# Patient Record
Sex: MALE | Race: WHITE | NOT HISPANIC OR LATINO | Employment: OTHER | URBAN - METROPOLITAN AREA
[De-identification: names, ages, dates, MRNs, and addresses within clinical notes are randomized per-mention and may not be internally consistent; named-entity substitution may affect disease eponyms.]

---

## 2017-06-12 ENCOUNTER — RECORDS - HEALTHEAST (OUTPATIENT)
Dept: LAB | Facility: CLINIC | Age: 57
End: 2017-06-12

## 2017-06-12 LAB
CHOLEST SERPL-MCNC: 197 MG/DL
FASTING STATUS PATIENT QL REPORTED: NORMAL
HDLC SERPL-MCNC: 65 MG/DL
LDLC SERPL CALC-MCNC: 115 MG/DL
TRIGL SERPL-MCNC: 84 MG/DL

## 2017-07-26 ENCOUNTER — THERAPY VISIT (OUTPATIENT)
Dept: PHYSICAL THERAPY | Facility: CLINIC | Age: 57
End: 2017-07-26
Payer: COMMERCIAL

## 2017-07-26 DIAGNOSIS — M54.50 LUMBAGO: ICD-10-CM

## 2017-07-26 DIAGNOSIS — M25.511 SHOULDER PAIN, RIGHT: ICD-10-CM

## 2017-07-26 PROCEDURE — 97161 PT EVAL LOW COMPLEX 20 MIN: CPT | Mod: GP | Performed by: PHYSICAL THERAPIST

## 2017-07-26 PROCEDURE — 97110 THERAPEUTIC EXERCISES: CPT | Mod: GP | Performed by: PHYSICAL THERAPIST

## 2017-07-26 NOTE — LETTER
[place] [facility]  [address]  [address]  [facility] [address]  [phone]    2017    Re: Nato Garcia   :   1960  MRN:  [id number]   REFERRING PHYSICIAN:   Félix Huitron    [facility]  Date of Initial Evaluation:  2017  Visits:  Rxs Used: 1  Reason for Referral:     Shoulder pain, right  Lumbago    [facility] Initial Evaluation  Subjective:  Patient is a 57-year-old male presenting with rehab right shoulder hpi.  The history is provided by the patient. No  was used.   Nato Garcia is a 57-year-old male with a right shoulder condition.  Condition occurred with: Unknown cause.  Condition occurred: for unknown reasons.  This is a new condition.  The patient is reporting insidious onset R shoulder pain that began in 2017.  Feels the pain in the joint, but has difficulty describing location. Reports it increases while reaching behind his back, golfing, weight lifting and while performing stretches from his referring provider.   The patient is right handed.  At the end of the session the eval, the patient also requested a back stretch.  Feels R sided low back pain w/o radiation following an event yesterday in which he was lifting.    Patient reports pain: In the joint.  Radiates to: none.  Pain is described as aching and is intermittent and reported as 2/10.  Associated symptoms: Loss of motion/stiffness. Pain is worse during the night.  Symptoms are exacerbated by using arm behind back, other, lifting, using arm overhead and lying on extremity (golfing) and relieved by rest.  Since onset symptoms are gradually improving.  Special tests: X-ray (normal).  Previous treatment includes other (deep tissue massage).  General health as reported by patient is good.  Pertinent medical history includes: High blood pressure.  Medical allergies: no.  Other surgeries include: None reported.  Current  medications: Anti-inflammatory and high blood pressure medication.  Current occupation is retired.        Objective:  Lumbar/SI Evaluation  ROM:  Arom wnl lumbar: REIL: decreases pain.  AROM Lumbar:   Flexion:        Midshins  Ext:                    50%   Side Bend:        Left:     Right:   Rotation:           Left:     Right:   Side Glide:        Left:     Right:         Shoulder Evaluation:  AROM:    Flexion:  Left:  180    Right:  155  Extension: Left: 73Right: 55  Abduction:  Left: 180   Right:  162  Flexion/External Rotation:  Left:  T5    Right:  T3  Extension/Internal Rotation:  Left:  T10    Right:  L2    PROM:  : R shldr PROM wfl all directions except IR.  Internal Rotation:  Left:  55    Right:  51  Strength:  : Low Trap R: +3/5++, L; 4/5.  Flexion: Left:5/5       Right: 5/5       Extension:  Left: 5/5        Right: 5/5          Re: Nato Garcia   :   1960      Abduction:  Left: 5/5     Right: 4+/5      Pain:++  Internal Rotation:  Left:5/5         Right: 5-/5      Pain:+  External Rotation:   Left:5/5       Right:5-/5      Pain:+    Horizontal Adduction:  Left:4+/5         Right:4-/5       Special Tests:    Right shoulder positive for the following special tests:Impingement  Right shoulder negative for the following special tests:Rotator cuff tear and Acrimioclavicular  Palpation:  normal    Assessment/Plan:    Patient is a 57 year old male with lumbar and right side shoulder complaints.    Patient has the following significant findings with corresponding treatment plan.                Diagnosis 1:  R shoulder pain  Pain -  hot/cold therapy, manual therapy, education and home program  Decreased ROM/flexibility - manual therapy and therapeutic exercise  Decreased strength - therapeutic exercise and therapeutic activities  Impaired muscle performance - neuro re-education  Decreased function - therapeutic activities  Diagnosis 2:  R sided low back pain   Pain -  hot/cold therapy, manual  therapy, education, directional preference exercise and home program  Decreased ROM/flexibility - manual therapy and therapeutic exercise  Decreased function - therapeutic activities    Therapy Evaluation Codes:   1) History comprised of:   Personal factors that impact the plan of care:  Time since onset of symptoms.    Comorbidity factors that impact the plan of care are:  High blood pressure.     Medications impacting care: Anti-inflammatory and High blood pressure.  2) Examination of Body Systems comprised of:   Body structures and functions that impact the plan of care:     Lumbar spine and Shoulder.   Activity limitations that impact the plan of care are:     Dressing, Lifting, Sports, Laying down and reaching.  3) Clinical presentation characteristics are:  Stable/Uncomplicated.  4) Decision-Making:  Low complexity using standardized patient assessment instrument and/or measureable assessment of functional outcome.  Cumulative Therapy Evaluation is: Low complexity.    Previous and current functional limitations: (See Goal Flow Sheet for this information)    Short term and Long term goals: (See Goal Flow Sheet for this information)   Communication ability: Patient appears to be able to clearly communicate and understand verbal and written communication and follow directions correctly.  Treatment Explanation - The following has been discussed with the patient: RX ordered/plan of care, Anticipated outcomes, Possible risks and side effects  This patient would benefit from PT intervention to resume normal activities.   Rehab potential is good.  Frequency:  1 X week, once daily  Duration:  for 8 weeks  Discharge Plan:  Achieve all LTG.  Independent in home treatment program.  Reach maximal therapeutic benefit.      Thank you for your referral.    INQUIRIES  Therapist: Muriel Boyle, PT  [facility]  [address] [place] [address]  [address]  [facility] [address]  Phone: [phone]/Fax: [phone]

## 2017-07-26 NOTE — PROGRESS NOTES
[place] [facility] Initial Evaluation    Subjective:    Patient is a 57 year old male presenting with rehab right shoulder hpi. The history is provided by the patient. No  was used.   Nato Garcia is a 57 year old male with a right shoulder condition.  Condition occurred with:  Unknown cause.  Condition occurred: for unknown reasons.  This is a new condition  The patient is reporting insidious onset R shoulder pain that began in March 2017. Feels the pain in the joint, but has difficulty describing location. Reports it increases while reaching behind his back, golfing, weight lifting and while performing stretches from his referring provider.     The patient is right handed.    At the end of the session the eval, the patient also requested a back stretch. Feels R sided low back pain w/o radiation following an event yesterday in which he was lifting..    Patient reports pain:  In the joint.  Radiates to: none.  Pain is described as aching and is intermittent and reported as 2/10.  Associated symptoms:  Loss of motion/stiffness. Pain is worse during the night.  Symptoms are exacerbated by using arm behind back, other, lifting, using arm overhead and lying on extremity (golfing) and relieved by rest.  Since onset symptoms are gradually improving.  Special tests:  X-ray (normal).  Previous treatment includes other (deep tissue massage).    General health as reported by patient is good.  Pertinent medical history includes:  High blood pressure.  Medical allergies: no.  Other surgeries include:  None reported.  Current medications:  Anti-inflammatory and high blood pressure medication.  Current occupation is retired.                                    Objective:    System         Lumbar/SI Evaluation  ROM:  Arom wnl lumbar: REIL: decreases pain.  AROM Lumbar:   Flexion:        Midshins  Ext:                    50%   Side Bend:        Left:     Right:   Rotation:           Left:      Right:   Side Glide:        Left:     Right:                                       Shoulder Evaluation:  ROM:  AROM:    Flexion:  Left:  180    Right:  155  Extension: Left: 73Right: 55  Abduction:  Left: 180   Right:  162                Flexion/External Rotation:  Left:  T5    Right:  T3  Extension/Internal Rotation:  Left:  T10    Right:  L2    PROM:  : R shldr PROM wfl all directions except IR.          Internal Rotation:  Left:  55    Right:  51                      Strength:  : Low Trap R: +3/5++, L; 4/5.  Flexion: Left:5/5   Pain:    Right: 5/5     Pain:   Extension:  Left: 5/5    Pain:    Right: 5/5    Pain:  Abduction:  Left: 5/5  Pain:    Right: 4+/5      Pain:++    Internal Rotation:  Left:5/5     Pain:    Right: 5-/5      Pain:+  External Rotation:   Left:5/5     Pain:   Right:5-/5      Pain:+      Horizontal Adduction:  Left:4+/5     Pain:    Right:4-/5     Pain:        Special Tests:      Right shoulder positive for the following special tests:Impingement  Right shoulder negative for the following special tests:Rotator cuff tear and Acrimioclavicular  Palpation:  normal                                         General     ROS    Assessment/Plan:      Patient is a 57 year old male with lumbar and right side shoulder complaints.    Patient has the following significant findings with corresponding treatment plan.                Diagnosis 1:  R shoulder pain  Pain -  hot/cold therapy, manual therapy, education and home program  Decreased ROM/flexibility - manual therapy and therapeutic exercise  Decreased strength - therapeutic exercise and therapeutic activities  Impaired muscle performance - neuro re-education  Decreased function - therapeutic activities  Diagnosis 2:  R sided low back pain   Pain -  hot/cold therapy, manual therapy, education, directional preference exercise and home program  Decreased ROM/flexibility - manual therapy and therapeutic exercise  Decreased function - therapeutic  activities    Therapy Evaluation Codes:   1) History comprised of:   Personal factors that impact the plan of care:      Time since onset of symptoms.    Comorbidity factors that impact the plan of care are:      High blood pressure.     Medications impacting care: Anti-inflammatory and High blood pressure.  2) Examination of Body Systems comprised of:   Body structures and functions that impact the plan of care:      Lumbar spine and Shoulder.   Activity limitations that impact the plan of care are:      Dressing, Lifting, Sports, Laying down and reaching.  3) Clinical presentation characteristics are:   Stable/Uncomplicated.  4) Decision-Making    Low complexity using standardized patient assessment instrument and/or measureable assessment of functional outcome.  Cumulative Therapy Evaluation is: Low complexity.    Previous and current functional limitations:  (See Goal Flow Sheet for this information)    Short term and Long term goals: (See Goal Flow Sheet for this information)     Communication ability:  Patient appears to be able to clearly communicate and understand verbal and written communication and follow directions correctly.  Treatment Explanation - The following has been discussed with the patient:   RX ordered/plan of care  Anticipated outcomes  Possible risks and side effects  This patient would benefit from PT intervention to resume normal activities.   Rehab potential is good.    Frequency:  1 X week, once daily  Duration:  for 8 weeks  Discharge Plan:  Achieve all LTG.  Independent in home treatment program.  Reach maximal therapeutic benefit.    Please refer to the daily flowsheet for treatment today, total treatment time and time spent performing 1:1 timed codes.

## 2017-08-07 ENCOUNTER — THERAPY VISIT (OUTPATIENT)
Dept: PHYSICAL THERAPY | Facility: CLINIC | Age: 57
End: 2017-08-07
Payer: COMMERCIAL

## 2017-08-07 DIAGNOSIS — M25.511 SHOULDER PAIN, RIGHT: ICD-10-CM

## 2017-08-07 DIAGNOSIS — M54.50 LUMBAGO: ICD-10-CM

## 2017-08-07 PROCEDURE — 97112 NEUROMUSCULAR REEDUCATION: CPT | Mod: GP | Performed by: PHYSICAL THERAPIST

## 2017-08-07 PROCEDURE — 97110 THERAPEUTIC EXERCISES: CPT | Mod: GP | Performed by: PHYSICAL THERAPIST

## 2017-08-14 ENCOUNTER — THERAPY VISIT (OUTPATIENT)
Dept: PHYSICAL THERAPY | Facility: CLINIC | Age: 57
End: 2017-08-14
Payer: COMMERCIAL

## 2017-08-14 DIAGNOSIS — M54.50 LUMBAGO: ICD-10-CM

## 2017-08-14 DIAGNOSIS — M25.511 SHOULDER PAIN, RIGHT: ICD-10-CM

## 2017-08-14 PROCEDURE — 97110 THERAPEUTIC EXERCISES: CPT | Mod: GP | Performed by: PHYSICAL THERAPIST

## 2017-08-14 PROCEDURE — 97112 NEUROMUSCULAR REEDUCATION: CPT | Mod: GP | Performed by: PHYSICAL THERAPIST

## 2017-08-28 ENCOUNTER — THERAPY VISIT (OUTPATIENT)
Dept: PHYSICAL THERAPY | Facility: CLINIC | Age: 57
End: 2017-08-28
Payer: COMMERCIAL

## 2017-08-28 DIAGNOSIS — M54.50 LUMBAGO: ICD-10-CM

## 2017-08-28 DIAGNOSIS — M25.511 SHOULDER PAIN, RIGHT: ICD-10-CM

## 2017-08-28 PROCEDURE — 97112 NEUROMUSCULAR REEDUCATION: CPT | Mod: GP | Performed by: PHYSICAL THERAPIST

## 2017-08-28 PROCEDURE — 97110 THERAPEUTIC EXERCISES: CPT | Mod: GP | Performed by: PHYSICAL THERAPIST

## 2017-09-11 ENCOUNTER — THERAPY VISIT (OUTPATIENT)
Dept: PHYSICAL THERAPY | Facility: CLINIC | Age: 57
End: 2017-09-11
Payer: COMMERCIAL

## 2017-09-11 DIAGNOSIS — M54.50 LUMBAGO: ICD-10-CM

## 2017-09-11 DIAGNOSIS — M25.511 SHOULDER PAIN, RIGHT: ICD-10-CM

## 2017-09-11 PROCEDURE — 97112 NEUROMUSCULAR REEDUCATION: CPT | Mod: GP | Performed by: PHYSICAL THERAPIST

## 2017-09-11 PROCEDURE — 97110 THERAPEUTIC EXERCISES: CPT | Mod: GP | Performed by: PHYSICAL THERAPIST

## 2017-09-11 NOTE — LETTER
[place] [facility]  [address]  [address]  [facility] [address]  [phone]    2017    Re: Nato Garcia   :   1960  MRN:  [id number]   REFERRING PHYSICIAN:   Félix Huitron    [facility]    Date of Initial Evaluation: 17  Visits:  Rxs Used: 5  Reason for Referral:     Shoulder pain, right  Lumbago    EVALUATION SUMMARY    Assessment/Plan:      DISCHARGE REPORT  Progress reporting period is from 2017 to 2017.       SUBJECTIVE  Subjective changes noted by patient:  States his R shoulder is feeling pretty good. Has been golfing and the R shoulder only bothers him a little. Does cont to have pain when reaching behind, but this is improving. Exercises are going well.    Current pain level is 0/10.     Previous pain level was  2/10.   Changes in function:  Yes (See Goal flowsheet attached for changes in current functional level)  Adverse reaction to treatment or activity: None    OBJECTIVE  Changes noted in objective findings:  Yes,   Objective:   AROM R Shldr Flx: 163, Abd: 170 painful, Ext/IR: T10 painful;   Strength R shldr horizontal abd: -5/5, Low Trap: -4/5, Flx: 5/5, Abd: 5/5; IR: 5/5, ER: 5/5     ASSESSMENT/PLAN  Updated problem list and treatment plan: Diagnosis 1:  R shoulder pain  Pain -  home program  Decreased ROM/flexibility - home program  Decreased strength - home program  Impaired muscle performance - home program  Decreased function - home program  STG/LTGs have been met or progress has been made towards goals:  Yes (See Goal flow sheet completed today.)        Re: Nato Garcia   :   1960    Assessment of Progress: The patient's condition is improving.  The patient's condition has potential to improve.  Patient is meeting short term goals and is progressing towards long term goals.  Self Management Plans:  Patient is independent in a home treatment program and feels he can continue working  towards the remaining minimal pain on his own.  I have re-evaluated this patient and find that the nature, scope, duration and intensity of the therapy is appropriate for the medical condition of the patient.  Nato continues to require the following intervention to meet STG and LTG's:  PT intervention is no longer required to meet STG/LTG.    Recommendations:  This patient is ready to be discharged from therapy and continue their home treatment program.    Continue to work on HEP for resolution of remaining pain; check back should pain fail to improve with continued strengthening.          Thank you for your referral.    INQUIRIES  Therapist: _____________________________________________Muriel Boyle PT, DPT  [facility] [address] [address]  Phone: [phone]  Fax: [phone]

## 2017-09-11 NOTE — PROGRESS NOTES
Subjective:    HPI                    Objective:    System    Physical Exam    General     ROS    Assessment/Plan:      DISCHARGE REPORT    Progress reporting period is from 7/26/2017 to 9/11/2017.       SUBJECTIVE  Subjective changes noted by patient:  States his R shoulder is feeling pretty good. Has been golfing and the R shoulder only bothers him a little. Does cont to have pain when reaching behind, but this is improving. Exercises are going well.    Current pain level is 0/10.     Previous pain level was  2/10.   Changes in function:  Yes (See Goal flowsheet attached for changes in current functional level)  Adverse reaction to treatment or activity: None    OBJECTIVE  Changes noted in objective findings:  Yes,   Objective:   AROM R Shldr Flx: 163, Abd: 170 painful, Ext/IR: T10 painful;   Strength R shldr horizontal abd: -5/5, Low Trap: -4/5, Flx: 5/5, Abd: 5/5; IR: 5/5, ER: 5/5     ASSESSMENT/PLAN  Updated problem list and treatment plan: Diagnosis 1:  R shoulder pain  Pain -  home program  Decreased ROM/flexibility - home program  Decreased strength - home program  Impaired muscle performance - home program  Decreased function - home program  STG/LTGs have been met or progress has been made towards goals:  Yes (See Goal flow sheet completed today.)  Assessment of Progress: The patient's condition is improving.  The patient's condition has potential to improve.  Patient is meeting short term goals and is progressing towards long term goals.  Self Management Plans:  Patient is independent in a home treatment program and feels he can continue working towards the remaining minimal pain on his own.  I have re-evaluated this patient and find that the nature, scope, duration and intensity of the therapy is appropriate for the medical condition of the patient.  Nato continues to require the following intervention to meet STG and LTG's:  PT intervention is no longer required to meet  STG/LTG.    Recommendations:  This patient is ready to be discharged from therapy and continue their home treatment program.    Continue to work on HEP for resolution of remaining pain; check back should pain fail to improve with continued strengthening.    Please refer to the daily flowsheet for treatment today, total treatment time and time spent performing 1:1 timed codes.

## 2017-11-18 ENCOUNTER — TRANSFERRED RECORDS (OUTPATIENT)
Dept: HEALTH INFORMATION MANAGEMENT | Facility: CLINIC | Age: 57
End: 2017-11-18

## 2017-11-18 LAB
CREATININE (EXTERNAL): 0.87 MG/DL (ref 0.73–1.18)
GFR ESTIMATED (EXTERNAL): >60 ML/MIN/1.73M2
GFR ESTIMATED (IF AFRICAN AMERICAN) (EXTERNAL): >60 ML/MIN/1.73M2
GLUCOSE (EXTERNAL): 100 MG/DL (ref 70–180)
POTASSIUM (EXTERNAL): 3.8 MMOL/L (ref 3.5–5.1)

## 2018-07-19 ENCOUNTER — RECORDS - HEALTHEAST (OUTPATIENT)
Dept: LAB | Facility: CLINIC | Age: 58
End: 2018-07-19

## 2018-07-19 LAB
ANION GAP SERPL CALCULATED.3IONS-SCNC: 10 MMOL/L (ref 5–18)
BUN SERPL-MCNC: 14 MG/DL (ref 8–22)
CALCIUM SERPL-MCNC: 10.4 MG/DL (ref 8.5–10.5)
CHLORIDE BLD-SCNC: 100 MMOL/L (ref 98–107)
CO2 SERPL-SCNC: 28 MMOL/L (ref 22–31)
CREAT SERPL-MCNC: 0.87 MG/DL (ref 0.7–1.3)
GFR SERPL CREATININE-BSD FRML MDRD: >60 ML/MIN/1.73M2
GLUCOSE BLD-MCNC: 84 MG/DL (ref 70–125)
POTASSIUM BLD-SCNC: 4.5 MMOL/L (ref 3.5–5)
SODIUM SERPL-SCNC: 138 MMOL/L (ref 136–145)

## 2018-07-24 LAB
SHBG SERPL-SCNC: 24 NMOL/L (ref 11–80)
TESTOST FREE SERPL-MCNC: 6.76 NG/DL (ref 4.7–24.4)
TESTOST SERPL-MCNC: 290 NG/DL (ref 240–950)

## 2018-12-17 ENCOUNTER — RECORDS - HEALTHEAST (OUTPATIENT)
Dept: LAB | Facility: CLINIC | Age: 58
End: 2018-12-17

## 2018-12-17 LAB
CHOLEST SERPL-MCNC: 210 MG/DL
FASTING STATUS PATIENT QL REPORTED: ABNORMAL
HDLC SERPL-MCNC: 63 MG/DL
LDLC SERPL CALC-MCNC: 128 MG/DL
POTASSIUM BLD-SCNC: 4 MMOL/L (ref 3.5–5)
TRIGL SERPL-MCNC: 93 MG/DL

## 2019-08-06 ENCOUNTER — RECORDS - HEALTHEAST (OUTPATIENT)
Dept: LAB | Facility: CLINIC | Age: 59
End: 2019-08-06

## 2019-08-06 LAB
ALBUMIN SERPL-MCNC: 4.2 G/DL (ref 3.5–5)
ALP SERPL-CCNC: 57 U/L (ref 45–120)
ALT SERPL W P-5'-P-CCNC: 20 U/L (ref 0–45)
ANION GAP SERPL CALCULATED.3IONS-SCNC: 7 MMOL/L (ref 5–18)
AST SERPL W P-5'-P-CCNC: 23 U/L (ref 0–40)
BILIRUB SERPL-MCNC: 0.3 MG/DL (ref 0–1)
BUN SERPL-MCNC: 12 MG/DL (ref 8–22)
CALCIUM SERPL-MCNC: 9.7 MG/DL (ref 8.5–10.5)
CHLORIDE BLD-SCNC: 94 MMOL/L (ref 98–107)
CO2 SERPL-SCNC: 29 MMOL/L (ref 22–31)
CREAT SERPL-MCNC: 0.81 MG/DL (ref 0.7–1.3)
GFR SERPL CREATININE-BSD FRML MDRD: >60 ML/MIN/1.73M2
GLUCOSE BLD-MCNC: 87 MG/DL (ref 70–125)
POTASSIUM BLD-SCNC: 4.1 MMOL/L (ref 3.5–5)
PROT SERPL-MCNC: 6.8 G/DL (ref 6–8)
SODIUM SERPL-SCNC: 130 MMOL/L (ref 136–145)

## 2020-08-25 ENCOUNTER — RECORDS - HEALTHEAST (OUTPATIENT)
Dept: LAB | Facility: CLINIC | Age: 60
End: 2020-08-25

## 2020-08-25 LAB
ANION GAP SERPL CALCULATED.3IONS-SCNC: 9 MMOL/L (ref 5–18)
BUN SERPL-MCNC: 16 MG/DL (ref 8–22)
CALCIUM SERPL-MCNC: 9.8 MG/DL (ref 8.5–10.5)
CHLORIDE BLD-SCNC: 98 MMOL/L (ref 98–107)
CHOLEST SERPL-MCNC: 196 MG/DL
CO2 SERPL-SCNC: 27 MMOL/L (ref 22–31)
CREAT SERPL-MCNC: 0.95 MG/DL (ref 0.7–1.3)
FASTING STATUS PATIENT QL REPORTED: NORMAL
GFR SERPL CREATININE-BSD FRML MDRD: >60 ML/MIN/1.73M2
GLUCOSE BLD-MCNC: 95 MG/DL (ref 70–125)
HDLC SERPL-MCNC: 61 MG/DL
LDLC SERPL CALC-MCNC: 113 MG/DL
POTASSIUM BLD-SCNC: 4.2 MMOL/L (ref 3.5–5)
SODIUM SERPL-SCNC: 134 MMOL/L (ref 136–145)
TRIGL SERPL-MCNC: 108 MG/DL

## 2020-08-27 LAB
SHBG SERPL-SCNC: 30 NMOL/L (ref 11–80)
TESTOST FREE SERPL-MCNC: 14.07 NG/DL (ref 4.7–24.4)
TESTOST SERPL-MCNC: 614 NG/DL (ref 240–950)

## 2020-12-29 ENCOUNTER — RECORDS - HEALTHEAST (OUTPATIENT)
Dept: LAB | Facility: CLINIC | Age: 60
End: 2020-12-29

## 2020-12-29 LAB — PSA SERPL-MCNC: 0.8 NG/ML (ref 0–4.5)

## 2021-04-06 ENCOUNTER — IMMUNIZATION (OUTPATIENT)
Dept: FAMILY MEDICINE | Facility: OTHER | Age: 61
End: 2021-04-06
Attending: FAMILY MEDICINE
Payer: COMMERCIAL

## 2021-04-06 PROCEDURE — 0031A PR COVID VAC JANSSEN AD26 0.5ML: CPT

## 2021-04-06 PROCEDURE — 91303 PR COVID VAC JANSSEN AD26 0.5ML: CPT

## 2021-05-16 ENCOUNTER — HEALTH MAINTENANCE LETTER (OUTPATIENT)
Age: 61
End: 2021-05-16

## 2021-09-05 ENCOUNTER — HEALTH MAINTENANCE LETTER (OUTPATIENT)
Age: 61
End: 2021-09-05

## 2021-11-15 ENCOUNTER — LAB REQUISITION (OUTPATIENT)
Dept: LAB | Facility: CLINIC | Age: 61
End: 2021-11-15
Payer: COMMERCIAL

## 2021-11-15 DIAGNOSIS — R00.0 TACHYCARDIA, UNSPECIFIED: ICD-10-CM

## 2021-11-15 DIAGNOSIS — Z13.220 ENCOUNTER FOR SCREENING FOR LIPOID DISORDERS: ICD-10-CM

## 2021-11-15 DIAGNOSIS — I10 ESSENTIAL (PRIMARY) HYPERTENSION: ICD-10-CM

## 2021-11-15 LAB
ANION GAP SERPL CALCULATED.3IONS-SCNC: 10 MMOL/L (ref 5–18)
BUN SERPL-MCNC: 15 MG/DL (ref 8–22)
CALCIUM SERPL-MCNC: 10.5 MG/DL (ref 8.5–10.5)
CHLORIDE BLD-SCNC: 97 MMOL/L (ref 98–107)
CHOLEST SERPL-MCNC: 194 MG/DL
CO2 SERPL-SCNC: 29 MMOL/L (ref 22–31)
CREAT SERPL-MCNC: 0.91 MG/DL (ref 0.7–1.3)
FASTING STATUS PATIENT QL REPORTED: NORMAL
GFR SERPL CREATININE-BSD FRML MDRD: 90 ML/MIN/1.73M2
GLUCOSE BLD-MCNC: 85 MG/DL (ref 70–125)
HDLC SERPL-MCNC: 61 MG/DL
LDLC SERPL CALC-MCNC: 107 MG/DL
POTASSIUM BLD-SCNC: 3.5 MMOL/L (ref 3.5–5)
SODIUM SERPL-SCNC: 136 MMOL/L (ref 136–145)
TRIGL SERPL-MCNC: 129 MG/DL
TSH SERPL DL<=0.005 MIU/L-ACNC: 4.24 UIU/ML (ref 0.3–5)

## 2021-11-15 PROCEDURE — 84443 ASSAY THYROID STIM HORMONE: CPT | Mod: ORL | Performed by: PHYSICIAN ASSISTANT

## 2021-11-15 PROCEDURE — 80048 BASIC METABOLIC PNL TOTAL CA: CPT | Mod: ORL | Performed by: PHYSICIAN ASSISTANT

## 2021-11-15 PROCEDURE — 80061 LIPID PANEL: CPT | Mod: ORL | Performed by: PHYSICIAN ASSISTANT

## 2021-11-18 ENCOUNTER — HOSPITAL ENCOUNTER (OUTPATIENT)
Dept: CARDIOLOGY | Facility: CLINIC | Age: 61
Discharge: HOME | End: 2021-11-18
Admitting: PHYSICIAN ASSISTANT
Payer: COMMERCIAL

## 2021-11-18 DIAGNOSIS — R00.0 TACHYCARDIA: ICD-10-CM

## 2021-11-18 PROCEDURE — 93270 REMOTE 30 DAY ECG REV/REPORT: CPT

## 2021-12-20 PROCEDURE — 93272 ECG/REVIEW INTERPRET ONLY: CPT | Performed by: INTERNAL MEDICINE

## 2022-05-11 ENCOUNTER — LAB REQUISITION (OUTPATIENT)
Dept: LAB | Facility: CLINIC | Age: 62
End: 2022-05-11
Payer: COMMERCIAL

## 2022-05-11 DIAGNOSIS — J45.20 MILD INTERMITTENT ASTHMA, UNCOMPLICATED: ICD-10-CM

## 2022-05-11 PROCEDURE — U0005 INFEC AGEN DETEC AMPLI PROBE: HCPCS | Mod: ORL | Performed by: FAMILY MEDICINE

## 2022-05-12 LAB — SARS-COV-2 RNA RESP QL NAA+PROBE: NEGATIVE

## 2022-06-12 ENCOUNTER — HEALTH MAINTENANCE LETTER (OUTPATIENT)
Age: 62
End: 2022-06-12

## 2022-08-22 ENCOUNTER — TRANSFERRED RECORDS (OUTPATIENT)
Dept: HEALTH INFORMATION MANAGEMENT | Facility: CLINIC | Age: 62
End: 2022-08-22

## 2022-10-05 ENCOUNTER — MEDICAL CORRESPONDENCE (OUTPATIENT)
Dept: HEALTH INFORMATION MANAGEMENT | Facility: CLINIC | Age: 62
End: 2022-10-05

## 2022-10-05 ENCOUNTER — TRANSFERRED RECORDS (OUTPATIENT)
Dept: HEALTH INFORMATION MANAGEMENT | Facility: CLINIC | Age: 62
End: 2022-10-05

## 2022-10-05 ENCOUNTER — LAB REQUISITION (OUTPATIENT)
Dept: LAB | Facility: CLINIC | Age: 62
End: 2022-10-05
Payer: COMMERCIAL

## 2022-10-05 DIAGNOSIS — I10 ESSENTIAL (PRIMARY) HYPERTENSION: ICD-10-CM

## 2022-10-05 DIAGNOSIS — I47.10 SUPRAVENTRICULAR TACHYCARDIA (H): ICD-10-CM

## 2022-10-05 LAB
ANION GAP SERPL CALCULATED.3IONS-SCNC: 12 MMOL/L (ref 7–15)
BUN SERPL-MCNC: 15.2 MG/DL (ref 8–23)
CALCIUM SERPL-MCNC: 10.2 MG/DL (ref 8.8–10.2)
CHLORIDE SERPL-SCNC: 96 MMOL/L (ref 98–107)
CHOLEST SERPL-MCNC: 224 MG/DL
CREAT SERPL-MCNC: 0.87 MG/DL (ref 0.67–1.17)
DEPRECATED HCO3 PLAS-SCNC: 27 MMOL/L (ref 22–29)
GFR SERPL CREATININE-BSD FRML MDRD: >90 ML/MIN/1.73M2
GLUCOSE SERPL-MCNC: 103 MG/DL (ref 70–99)
HDLC SERPL-MCNC: 72 MG/DL
LDLC SERPL CALC-MCNC: 137 MG/DL
NONHDLC SERPL-MCNC: 152 MG/DL
POTASSIUM SERPL-SCNC: 4 MMOL/L (ref 3.4–5.3)
SODIUM SERPL-SCNC: 135 MMOL/L (ref 136–145)
TRIGL SERPL-MCNC: 75 MG/DL
TSH SERPL DL<=0.005 MIU/L-ACNC: 3.48 UIU/ML (ref 0.3–4.2)

## 2022-10-05 PROCEDURE — 80048 BASIC METABOLIC PNL TOTAL CA: CPT | Mod: ORL | Performed by: FAMILY MEDICINE

## 2022-10-05 PROCEDURE — 80061 LIPID PANEL: CPT | Mod: ORL | Performed by: FAMILY MEDICINE

## 2022-10-05 PROCEDURE — 84443 ASSAY THYROID STIM HORMONE: CPT | Mod: ORL | Performed by: FAMILY MEDICINE

## 2022-10-06 ENCOUNTER — TRANSFERRED RECORDS (OUTPATIENT)
Dept: HEALTH INFORMATION MANAGEMENT | Facility: CLINIC | Age: 62
End: 2022-10-06

## 2022-10-06 LAB — EJECTION FRACTION: 66 %

## 2022-10-11 ENCOUNTER — TRANSCRIBE ORDERS (OUTPATIENT)
Dept: OTHER | Age: 62
End: 2022-10-11

## 2022-10-11 DIAGNOSIS — I47.19 PAROXYSMAL ATRIAL TACHYCARDIA (H): Primary | ICD-10-CM

## 2022-10-27 ENCOUNTER — OFFICE VISIT (OUTPATIENT)
Dept: CARDIOLOGY | Facility: CLINIC | Age: 62
End: 2022-10-27
Attending: FAMILY MEDICINE
Payer: COMMERCIAL

## 2022-10-27 VITALS
BODY MASS INDEX: 27.44 KG/M2 | HEIGHT: 69 IN | SYSTOLIC BLOOD PRESSURE: 144 MMHG | DIASTOLIC BLOOD PRESSURE: 70 MMHG | WEIGHT: 185.3 LBS | HEART RATE: 76 BPM | RESPIRATION RATE: 16 BRPM

## 2022-10-27 DIAGNOSIS — I48.0 PAROXYSMAL ATRIAL FIBRILLATION (H): Primary | ICD-10-CM

## 2022-10-27 PROCEDURE — 99204 OFFICE O/P NEW MOD 45 MIN: CPT | Performed by: INTERNAL MEDICINE

## 2022-10-27 RX ORDER — NYSTATIN 100000 U/G
CREAM TOPICAL
COMMUNITY
Start: 2022-03-30

## 2022-10-27 RX ORDER — HYDROCHLOROTHIAZIDE 50 MG/1
50 TABLET ORAL DAILY
COMMUNITY
Start: 2022-09-28

## 2022-10-27 RX ORDER — METOPROLOL TARTRATE 25 MG/1
TABLET, FILM COATED ORAL
COMMUNITY
Start: 2022-10-05

## 2022-10-27 RX ORDER — DICYCLOMINE HCL 20 MG
2 TABLET ORAL 2 TIMES DAILY
COMMUNITY
Start: 2022-09-10

## 2022-10-27 RX ORDER — TRAZODONE HYDROCHLORIDE 100 MG/1
100 TABLET ORAL AT BEDTIME
COMMUNITY
Start: 2022-09-28

## 2022-10-27 RX ORDER — BUDESONIDE AND FORMOTEROL FUMARATE DIHYDRATE 160; 4.5 UG/1; UG/1
AEROSOL RESPIRATORY (INHALATION)
COMMUNITY
Start: 2022-10-07

## 2022-10-27 RX ORDER — LISINOPRIL 40 MG/1
40 TABLET ORAL DAILY
COMMUNITY
Start: 2022-09-28

## 2022-10-27 RX ORDER — CELECOXIB 200 MG/1
CAPSULE ORAL
COMMUNITY
Start: 2022-07-09

## 2022-10-27 RX ORDER — ALBUTEROL SULFATE 90 UG/1
AEROSOL, METERED RESPIRATORY (INHALATION)
COMMUNITY
Start: 2022-05-26

## 2022-10-27 RX ORDER — ASPIRIN 81 MG/1
81 TABLET ORAL DAILY
COMMUNITY

## 2022-10-27 NOTE — LETTER
10/27/2022    Florence Chavis  [facility] [address]  [facility] [address]    RE: Nato Garcia       Dear Colleague,     I had the pleasure of seeing Nato Garcia in the [facility].      [facility]  [phone]      Assessment/Recommendations   Patient with a history of palpitations in December 2021 had atrial tachycardia on the monitor.  More recently he had a 13-hour episode of palpitations and the strips on his apple watch PDF format do show atrial fibrillation.  He does not have risk factors with exception of hypertension and his CHADS2 vascular score is 1.  He was recently put on metoprolol after this episode which may be of some benefit to him.    We discussed the risk of cerebrovascular accident and I do not recommend anticoagulants now with a CHADS2 Vascor of 1 and only 1 episode lasting 13 hours.  If he gets a recurrent episode I would like him to get an EKG so we have documentation on a twelve-lead of his atrial fibrillation.  I would have a very low threshold to ask him to see one of our electrophysiologists as the outcomes for pulmonary vein isolation procedure are much better early in the course of atrial fibrillation as opposed to later in the course of atrial fibrillation.  We discussed that today.    His thyroid was unremarkable, he drinks a couple glasses of wine most days but is cut back a bit, and does not take any stimulants and uses 2 cups of coffee a day.  I would not change in his habits right now unless he has having recurrent episodes.    As part of a complete evaluation for atrial fibrillation we will get a stress test with a stress echocardiogram.  Recent echocardiogram at [facility] was essentially normal.    He will call us if he has any further episodes and he will try to send PDFs of the atrial fibrillation to me on [employer].    Thank you for allowing us to participate in his care.  45 minutes spent with chart  review, patient visit, documentation and .     History of Present Illness/Subjective    Mr. Nato Garcia is a 62 year old male with known history of palpitations and known history of hypertension.  He had palpitations at least noted in 2021 and notices fast heart rates on his apple watch.  He had a prolonged monitor which showed 1 episode of atrial tachycardia for about 15 seconds with a maximal heart rate of 159 bpm.  He would continue to get these episodes but on  he noticed palpitations and his apple watch that he did have atrial fibrillation.  I have reviewed the strips and I agree with the apple watch that this is atrial fibrillation with mildly increased ventricular response.  He did feel weird and felt a little bit dizzy and felt like his eyesight was goofy during this time.  But he did go to the gym and try to lift weights although he struggled with that.  After about 12 or 13 hours it it resolved.  He did see Dr. Herbert who started him on a low-dose beta-blocker and ordered an echocardiogram which was unremarkable.    He is active although cannot do as much physical activity because of an issue with his labrum which is not easily fixed.  He is run about 17 marathons in the past.  He has mild asthma and does not use albuterol much and did not use it the day he had the atrial fibrillation.  He denies orthopnea or paroxysmal nocturnal dyspnea.  He probably snores but he gets up a lot namely 3-4 times a night and naps usually once during the day but does not fall asleep nor does he have daytime somnolence.  He has known history no known history of rheumatic fever, heart murmur, cerebrovascular accident or TIA.  He is treated for hypertension, is not diabetic, has never smoked cigarettes and his lipids are actually quite good.  His father had stents in his late 60s or early 70s and  at age 79.    Patient is , has 3 grown children 4 grandchildren.  He enjoys being a  "grandfather and lights up when discussing it.  He worked as an , retired but now is working part-time as an .  He overall enjoys his work.        ECG: Personally reviewed.  EKG from November 2017 shows normal sinus rhythm, and no delta wave.  previous tracings\"}.       Physical Examination Review of Systems   BP (!) 144/70 (BP Location: Right arm, Patient Position: Sitting, Cuff Size: Adult Regular)   Pulse 76   Resp 16   Ht 1.753 m (5' 9\")   Wt 84.1 kg (185 lb 4.8 oz)   BMI 27.36 kg/m    Body mass index is 27.36 kg/m .  Wt Readings from Last 3 Encounters:   10/27/22 84.1 kg (185 lb 4.8 oz)     General Appearance:   Alert, cooperative and in no acute distress.   ENT/Mouth: Patient wearing a mask.      EYES:  no scleral icterus, normal conjunctivae   Neck: JVP normal. No Hepatojugular reflux. Thyroid not visualized.   Chest/Lungs:   Lungs are clear to auscultation, equal chest wall expansion.   Cardiovascular:   S1, S2 without murmur ,clicks or rubs. Brachial, radial and posterior tibial pulses are intact and symetric. No carotid bruits noted   Abdomen:  Nontender.   Extremities: No cyanosis, clubbing or edema   Skin: no xanthelasma, warm.    Neurologic: normal arm movement bilateral, no tremors     Psychiatric: Appropriate affect.      Enc Vitals  BP: (!) 144/70  Pulse: 76  Resp: 16  Weight: 84.1 kg (185 lb 4.8 oz)  Height: 175.3 cm (5' 9\")                                           Medical History  Surgical History Family History Social History   No past medical history on file. No past surgical history on file. No family history on file. Social History     Socioeconomic History     Marital status:      Spouse name: Not on file     Number of children: Not on file     Years of education: Not on file     Highest education level: Not on file   Occupational History     Not on file   Tobacco Use     Smoking status: Never     Smokeless tobacco: Never   Vaping Use     Vaping Use: Never used "   Substance and Sexual Activity     Alcohol use: Not on file     Drug use: Never     Sexual activity: Not on file   Other Topics Concern     Not on file   Social History Narrative     Not on file     Social Determinants of Health     Financial Resource Strain: Not on file   Food Insecurity: Not on file   Transportation Needs: Not on file   Physical Activity: Not on file   Stress: Not on file   Social Connections: Not on file   Intimate Partner Violence: Not on file   Housing Stability: Not on file          Medications  Allergies   Current Outpatient Medications   Medication Sig Dispense Refill     albuterol (PROAIR HFA/PROVENTIL HFA/VENTOLIN HFA) 108 (90 Base) MCG/ACT inhaler INHALE 2 PUFFS 4 TIMES A DAY AS NEEDED       aspirin 81 MG EC tablet Take 81 mg by mouth daily       celecoxib (CELEBREX) 200 MG capsule TAKE 1 CAPSULE BY MOUTH TWICE A DAY AS NEEDED       dicyclomine (BENTYL) 20 MG tablet Take 2 tablets by mouth 2 times daily       hydrochlorothiazide (HYDRODIURIL) 50 MG tablet Take 50 mg by mouth daily       lisinopril (ZESTRIL) 40 MG tablet Take 40 mg by mouth daily       metoprolol tartrate (LOPRESSOR) 25 MG tablet TAKE 1 TABLET BY MOUTH TWICE A DAY WITH FOOD FOR 30 DAYS       nystatin (MYCOSTATIN) 630401 UNIT/GM external cream APPLY TO AFFECTED AREA 3 TIMES A DAY 15       SYMBICORT 160-4.5 MCG/ACT Inhaler INHALE 2 PUFFS TWICE A DAY FOR 30 DAYS       traZODone (DESYREL) 100 MG tablet Take 100 mg by mouth At Bedtime      No Known Allergies      Lab Results    Chemistry/lipid CBC Cardiac Enzymes/BNP/TSH/INR   Lab Results   Component Value Date    CHOL 224 (H) 10/05/2022    HDL 72 10/05/2022    TRIG 75 10/05/2022    BUN 15.2 10/05/2022     (L) 10/05/2022    CO2 27 10/05/2022    No results found for: WBC, HGB, HCT, MCV, PLT Lab Results   Component Value Date    TSH 3.48 10/05/2022                        Thank you for allowing me to participate in the care of your patient.      Sincerely,     Uriel CODY  MD [place]     [facility]  cc:   [address]over[address] [address]

## 2022-10-27 NOTE — PROGRESS NOTES
[facility]  [phone]      Assessment/Recommendations   Patient with a history of palpitations in December 2021 had atrial tachycardia on the monitor.  More recently he had a 13-hour episode of palpitations and the strips on his apple watch PDF format do show atrial fibrillation.  He does not have risk factors with exception of hypertension and his CHADS2 vascular score is 1.  He was recently put on metoprolol after this episode which may be of some benefit to him.    We discussed the risk of cerebrovascular accident and I do not recommend anticoagulants now with a CHADS2 Vascor of 1 and only 1 episode lasting 13 hours.  If he gets a recurrent episode I would like him to get an EKG so we have documentation on a twelve-lead of his atrial fibrillation.  I would have a very low threshold to ask him to see one of our electrophysiologists as the outcomes for pulmonary vein isolation procedure are much better early in the course of atrial fibrillation as opposed to later in the course of atrial fibrillation.  We discussed that today.    His thyroid was unremarkable, he drinks a couple glasses of wine most days but is cut back a bit, and does not take any stimulants and uses 2 cups of coffee a day.  I would not change in his habits right now unless he has having recurrent episodes.    As part of a complete evaluation for atrial fibrillation we will get a stress test with a stress echocardiogram.  Recent echocardiogram at [facility] was essentially normal.    He will call us if he has any further episodes and he will try to send PDFs of the atrial fibrillation to me on [employer].    Thank you for allowing us to participate in his care.  45 minutes spent with chart review, patient visit, documentation and .     History of Present Illness/Subjective    Mr. Nato Garcia is a 62 year old male with known history of palpitations and known history of hypertension.  He had palpitations at  least noted in 2021 and notices fast heart rates on his apple watch.  He had a prolonged monitor which showed 1 episode of atrial tachycardia for about 15 seconds with a maximal heart rate of 159 bpm.  He would continue to get these episodes but on  he noticed palpitations and his apple watch that he did have atrial fibrillation.  I have reviewed the strips and I agree with the apple watch that this is atrial fibrillation with mildly increased ventricular response.  He did feel weird and felt a little bit dizzy and felt like his eyesight was goofy during this time.  But he did go to the gym and try to lift weights although he struggled with that.  After about 12 or 13 hours it it resolved.  He did see Dr. Herbert who started him on a low-dose beta-blocker and ordered an echocardiogram which was unremarkable.    He is active although cannot do as much physical activity because of an issue with his labrum which is not easily fixed.  He is run about 17 marathons in the past.  He has mild asthma and does not use albuterol much and did not use it the day he had the atrial fibrillation.  He denies orthopnea or paroxysmal nocturnal dyspnea.  He probably snores but he gets up a lot namely 3-4 times a night and naps usually once during the day but does not fall asleep nor does he have daytime somnolence.  He has known history no known history of rheumatic fever, heart murmur, cerebrovascular accident or TIA.  He is treated for hypertension, is not diabetic, has never smoked cigarettes and his lipids are actually quite good.  His father had stents in his late 60s or early 70s and  at age 79.    Patient is , has 3 grown children 4 grandchildren.  He enjoys being a grandfather and lights up when discussing it.  He worked as an , retired but now is working part-time as an .  He overall enjoys his work.        ECG: Personally reviewed.  EKG from 2017 shows normal sinus  "rhythm, and no delta wave.  previous tracings\"}.       Physical Examination Review of Systems   BP (!) 144/70 (BP Location: Right arm, Patient Position: Sitting, Cuff Size: Adult Regular)   Pulse 76   Resp 16   Ht 1.753 m (5' 9\")   Wt 84.1 kg (185 lb 4.8 oz)   BMI 27.36 kg/m    Body mass index is 27.36 kg/m .  Wt Readings from Last 3 Encounters:   10/27/22 84.1 kg (185 lb 4.8 oz)     General Appearance:   Alert, cooperative and in no acute distress.   ENT/Mouth: Patient wearing a mask.      EYES:  no scleral icterus, normal conjunctivae   Neck: JVP normal. No Hepatojugular reflux. Thyroid not visualized.   Chest/Lungs:   Lungs are clear to auscultation, equal chest wall expansion.   Cardiovascular:   S1, S2 without murmur ,clicks or rubs. Brachial, radial and posterior tibial pulses are intact and symetric. No carotid bruits noted   Abdomen:  Nontender.   Extremities: No cyanosis, clubbing or edema   Skin: no xanthelasma, warm.    Neurologic: normal arm movement bilateral, no tremors     Psychiatric: Appropriate affect.      Enc Vitals  BP: (!) 144/70  Pulse: 76  Resp: 16  Weight: 84.1 kg (185 lb 4.8 oz)  Height: 175.3 cm (5' 9\")                                           Medical History  Surgical History Family History Social History   No past medical history on file. No past surgical history on file. No family history on file. Social History     Socioeconomic History     Marital status:      Spouse name: Not on file     Number of children: Not on file     Years of education: Not on file     Highest education level: Not on file   Occupational History     Not on file   Tobacco Use     Smoking status: Never     Smokeless tobacco: Never   Vaping Use     Vaping Use: Never used   Substance and Sexual Activity     Alcohol use: Not on file     Drug use: Never     Sexual activity: Not on file   Other Topics Concern     Not on file   Social History Narrative     Not on file     Social Determinants of Health "     Financial Resource Strain: Not on file   Food Insecurity: Not on file   Transportation Needs: Not on file   Physical Activity: Not on file   Stress: Not on file   Social Connections: Not on file   Intimate Partner Violence: Not on file   Housing Stability: Not on file          Medications  Allergies   Current Outpatient Medications   Medication Sig Dispense Refill     albuterol (PROAIR HFA/PROVENTIL HFA/VENTOLIN HFA) 108 (90 Base) MCG/ACT inhaler INHALE 2 PUFFS 4 TIMES A DAY AS NEEDED       aspirin 81 MG EC tablet Take 81 mg by mouth daily       celecoxib (CELEBREX) 200 MG capsule TAKE 1 CAPSULE BY MOUTH TWICE A DAY AS NEEDED       dicyclomine (BENTYL) 20 MG tablet Take 2 tablets by mouth 2 times daily       hydrochlorothiazide (HYDRODIURIL) 50 MG tablet Take 50 mg by mouth daily       lisinopril (ZESTRIL) 40 MG tablet Take 40 mg by mouth daily       metoprolol tartrate (LOPRESSOR) 25 MG tablet TAKE 1 TABLET BY MOUTH TWICE A DAY WITH FOOD FOR 30 DAYS       nystatin (MYCOSTATIN) 974773 UNIT/GM external cream APPLY TO AFFECTED AREA 3 TIMES A DAY 15       SYMBICORT 160-4.5 MCG/ACT Inhaler INHALE 2 PUFFS TWICE A DAY FOR 30 DAYS       traZODone (DESYREL) 100 MG tablet Take 100 mg by mouth At Bedtime      No Known Allergies      Lab Results    Chemistry/lipid CBC Cardiac Enzymes/BNP/TSH/INR   Lab Results   Component Value Date    CHOL 224 (H) 10/05/2022    HDL 72 10/05/2022    TRIG 75 10/05/2022    BUN 15.2 10/05/2022     (L) 10/05/2022    CO2 27 10/05/2022    No results found for: WBC, HGB, HCT, MCV, PLT Lab Results   Component Value Date    TSH 3.48 10/05/2022

## 2022-10-28 ENCOUNTER — MYC MEDICAL ADVICE (OUTPATIENT)
Dept: CARDIOLOGY | Facility: CLINIC | Age: 62
End: 2022-10-28

## 2022-11-09 ENCOUNTER — HOSPITAL ENCOUNTER (OUTPATIENT)
Dept: CARDIOLOGY | Facility: CLINIC | Age: 62
Discharge: HOME | End: 2022-11-09
Attending: INTERNAL MEDICINE | Admitting: INTERNAL MEDICINE
Payer: COMMERCIAL

## 2022-11-09 DIAGNOSIS — I48.0 PAROXYSMAL ATRIAL FIBRILLATION (H): ICD-10-CM

## 2022-11-09 PROCEDURE — 93016 CV STRESS TEST SUPVJ ONLY: CPT | Performed by: INTERNAL MEDICINE

## 2022-11-09 PROCEDURE — 93321 DOPPLER ECHO F-UP/LMTD STD: CPT | Mod: TC

## 2022-11-09 PROCEDURE — 93325 DOPPLER ECHO COLOR FLOW MAPG: CPT | Mod: 26 | Performed by: INTERNAL MEDICINE

## 2022-11-09 PROCEDURE — 93018 CV STRESS TEST I&R ONLY: CPT | Performed by: INTERNAL MEDICINE

## 2022-11-09 PROCEDURE — 93321 DOPPLER ECHO F-UP/LMTD STD: CPT | Mod: 26 | Performed by: INTERNAL MEDICINE

## 2022-11-09 PROCEDURE — 93325 DOPPLER ECHO COLOR FLOW MAPG: CPT | Mod: TC

## 2022-11-09 PROCEDURE — 93350 STRESS TTE ONLY: CPT | Mod: 26 | Performed by: INTERNAL MEDICINE

## 2023-06-24 ENCOUNTER — HEALTH MAINTENANCE LETTER (OUTPATIENT)
Age: 63
End: 2023-06-24

## 2023-10-24 ENCOUNTER — LAB REQUISITION (OUTPATIENT)
Dept: LAB | Facility: CLINIC | Age: 63
End: 2023-10-24
Payer: COMMERCIAL

## 2023-10-24 DIAGNOSIS — I10 ESSENTIAL (PRIMARY) HYPERTENSION: ICD-10-CM

## 2023-10-24 DIAGNOSIS — Z12.5 ENCOUNTER FOR SCREENING FOR MALIGNANT NEOPLASM OF PROSTATE: ICD-10-CM

## 2023-10-24 LAB
ANION GAP SERPL CALCULATED.3IONS-SCNC: 13 MMOL/L (ref 7–15)
BUN SERPL-MCNC: 13.5 MG/DL (ref 8–23)
CALCIUM SERPL-MCNC: 10.3 MG/DL (ref 8.8–10.2)
CHLORIDE SERPL-SCNC: 92 MMOL/L (ref 98–107)
CHOLEST SERPL-MCNC: 200 MG/DL
CREAT SERPL-MCNC: 0.82 MG/DL (ref 0.67–1.17)
DEPRECATED HCO3 PLAS-SCNC: 26 MMOL/L (ref 22–29)
EGFRCR SERPLBLD CKD-EPI 2021: >90 ML/MIN/1.73M2
GLUCOSE SERPL-MCNC: 95 MG/DL (ref 70–99)
HDLC SERPL-MCNC: 50 MG/DL
LDLC SERPL CALC-MCNC: 126 MG/DL
NONHDLC SERPL-MCNC: 150 MG/DL
POTASSIUM SERPL-SCNC: 3.4 MMOL/L (ref 3.4–5.3)
PSA SERPL DL<=0.01 NG/ML-MCNC: 0.79 NG/ML (ref 0–4.5)
SODIUM SERPL-SCNC: 131 MMOL/L (ref 135–145)
TRIGL SERPL-MCNC: 122 MG/DL

## 2023-10-24 PROCEDURE — G0103 PSA SCREENING: HCPCS | Mod: ORL | Performed by: PHYSICIAN ASSISTANT

## 2023-10-24 PROCEDURE — 80061 LIPID PANEL: CPT | Mod: ORL | Performed by: PHYSICIAN ASSISTANT

## 2023-10-24 PROCEDURE — 80048 BASIC METABOLIC PNL TOTAL CA: CPT | Mod: ORL | Performed by: PHYSICIAN ASSISTANT

## 2023-12-05 ENCOUNTER — LAB REQUISITION (OUTPATIENT)
Dept: LAB | Facility: CLINIC | Age: 63
End: 2023-12-05
Payer: COMMERCIAL

## 2023-12-05 DIAGNOSIS — I10 ESSENTIAL (PRIMARY) HYPERTENSION: ICD-10-CM

## 2023-12-05 LAB
ANION GAP SERPL CALCULATED.3IONS-SCNC: 9 MMOL/L (ref 7–15)
BUN SERPL-MCNC: 14.9 MG/DL (ref 8–23)
CALCIUM SERPL-MCNC: 10.5 MG/DL (ref 8.8–10.2)
CHLORIDE SERPL-SCNC: 101 MMOL/L (ref 98–107)
CREAT SERPL-MCNC: 0.83 MG/DL (ref 0.67–1.17)
DEPRECATED HCO3 PLAS-SCNC: 26 MMOL/L (ref 22–29)
EGFRCR SERPLBLD CKD-EPI 2021: >90 ML/MIN/1.73M2
GLUCOSE SERPL-MCNC: 87 MG/DL (ref 70–99)
POTASSIUM SERPL-SCNC: 4.6 MMOL/L (ref 3.4–5.3)
SODIUM SERPL-SCNC: 136 MMOL/L (ref 135–145)

## 2023-12-05 PROCEDURE — 80048 BASIC METABOLIC PNL TOTAL CA: CPT | Mod: ORL | Performed by: PHYSICIAN ASSISTANT

## 2024-03-30 ENCOUNTER — HEALTH MAINTENANCE LETTER (OUTPATIENT)
Age: 64
End: 2024-03-30

## 2024-09-17 ENCOUNTER — LAB REQUISITION (OUTPATIENT)
Dept: LAB | Facility: CLINIC | Age: 64
End: 2024-09-17
Payer: COMMERCIAL

## 2024-09-17 DIAGNOSIS — I10 ESSENTIAL (PRIMARY) HYPERTENSION: ICD-10-CM

## 2024-09-17 PROCEDURE — 80048 BASIC METABOLIC PNL TOTAL CA: CPT | Mod: ORL | Performed by: FAMILY MEDICINE

## 2024-09-18 LAB
ANION GAP SERPL CALCULATED.3IONS-SCNC: 12 MMOL/L (ref 7–15)
BUN SERPL-MCNC: 14.6 MG/DL (ref 8–23)
CALCIUM SERPL-MCNC: 10.1 MG/DL (ref 8.8–10.4)
CHLORIDE SERPL-SCNC: 99 MMOL/L (ref 98–107)
CREAT SERPL-MCNC: 1 MG/DL (ref 0.67–1.17)
EGFRCR SERPLBLD CKD-EPI 2021: 84 ML/MIN/1.73M2
GLUCOSE SERPL-MCNC: 93 MG/DL (ref 70–99)
HCO3 SERPL-SCNC: 25 MMOL/L (ref 22–29)
POTASSIUM SERPL-SCNC: 3.6 MMOL/L (ref 3.4–5.3)
SODIUM SERPL-SCNC: 136 MMOL/L (ref 135–145)

## 2024-10-08 ENCOUNTER — LAB REQUISITION (OUTPATIENT)
Dept: LAB | Facility: CLINIC | Age: 64
End: 2024-10-08
Payer: COMMERCIAL

## 2024-10-08 ENCOUNTER — TRANSFERRED RECORDS (OUTPATIENT)
Dept: HEALTH INFORMATION MANAGEMENT | Facility: CLINIC | Age: 64
End: 2024-10-08

## 2024-10-08 DIAGNOSIS — I10 ESSENTIAL (PRIMARY) HYPERTENSION: ICD-10-CM

## 2024-10-08 LAB
ANION GAP SERPL CALCULATED.3IONS-SCNC: 12 MMOL/L (ref 7–15)
BUN SERPL-MCNC: 14 MG/DL (ref 8–23)
CALCIUM SERPL-MCNC: 10.2 MG/DL (ref 8.8–10.4)
CHLORIDE SERPL-SCNC: 95 MMOL/L (ref 98–107)
CREAT SERPL-MCNC: 1.18 MG/DL (ref 0.67–1.17)
EGFRCR SERPLBLD CKD-EPI 2021: 68 ML/MIN/1.73M2
GLUCOSE SERPL-MCNC: 67 MG/DL (ref 70–99)
HCO3 SERPL-SCNC: 26 MMOL/L (ref 22–29)
POTASSIUM SERPL-SCNC: 3.9 MMOL/L (ref 3.4–5.3)
SODIUM SERPL-SCNC: 133 MMOL/L (ref 135–145)
TSH SERPL DL<=0.005 MIU/L-ACNC: 3.41 UIU/ML (ref 0.3–4.2)

## 2024-10-08 PROCEDURE — 84443 ASSAY THYROID STIM HORMONE: CPT | Mod: ORL | Performed by: STUDENT IN AN ORGANIZED HEALTH CARE EDUCATION/TRAINING PROGRAM

## 2024-10-08 PROCEDURE — 80048 BASIC METABOLIC PNL TOTAL CA: CPT | Mod: ORL | Performed by: STUDENT IN AN ORGANIZED HEALTH CARE EDUCATION/TRAINING PROGRAM

## 2024-11-18 ENCOUNTER — HOSPITAL ENCOUNTER (EMERGENCY)
Facility: CLINIC | Age: 64
Discharge: HOME | End: 2024-11-18
Attending: STUDENT IN AN ORGANIZED HEALTH CARE EDUCATION/TRAINING PROGRAM | Admitting: STUDENT IN AN ORGANIZED HEALTH CARE EDUCATION/TRAINING PROGRAM
Payer: COMMERCIAL

## 2024-11-18 ENCOUNTER — MYC MEDICAL ADVICE (OUTPATIENT)
Dept: CARDIOLOGY | Facility: CLINIC | Age: 64
End: 2024-11-18
Payer: COMMERCIAL

## 2024-11-18 VITALS
RESPIRATION RATE: 14 BRPM | HEART RATE: 95 BPM | WEIGHT: 180 LBS | SYSTOLIC BLOOD PRESSURE: 122 MMHG | HEIGHT: 69 IN | OXYGEN SATURATION: 97 % | BODY MASS INDEX: 26.66 KG/M2 | DIASTOLIC BLOOD PRESSURE: 70 MMHG | TEMPERATURE: 98.1 F

## 2024-11-18 DIAGNOSIS — I48.91 ATRIAL FIBRILLATION WITH RVR (H): ICD-10-CM

## 2024-11-18 LAB
ANION GAP SERPL CALCULATED.3IONS-SCNC: 14 MMOL/L (ref 7–15)
ATRIAL RATE - MUSE: 153 BPM
BASOPHILS # BLD AUTO: 0 10E3/UL (ref 0–0.2)
BASOPHILS NFR BLD AUTO: 0 %
BUN SERPL-MCNC: 19.7 MG/DL (ref 8–23)
CALCIUM SERPL-MCNC: 10.2 MG/DL (ref 8.8–10.4)
CHLORIDE SERPL-SCNC: 97 MMOL/L (ref 98–107)
CREAT SERPL-MCNC: 0.83 MG/DL (ref 0.67–1.17)
DIASTOLIC BLOOD PRESSURE - MUSE: 89 MMHG
EGFRCR SERPLBLD CKD-EPI 2021: >90 ML/MIN/1.73M2
EOSINOPHIL # BLD AUTO: 0.2 10E3/UL (ref 0–0.7)
EOSINOPHIL NFR BLD AUTO: 2 %
ERYTHROCYTE [DISTWIDTH] IN BLOOD BY AUTOMATED COUNT: 11.6 % (ref 10–15)
GLUCOSE SERPL-MCNC: 116 MG/DL (ref 70–99)
HCO3 SERPL-SCNC: 22 MMOL/L (ref 22–29)
HCT VFR BLD AUTO: 41.5 % (ref 40–53)
HGB BLD-MCNC: 15.3 G/DL (ref 13.3–17.7)
IMM GRANULOCYTES # BLD: 0 10E3/UL
IMM GRANULOCYTES NFR BLD: 0 %
INTERPRETATION ECG - MUSE: NORMAL
LYMPHOCYTES # BLD AUTO: 2 10E3/UL (ref 0.8–5.3)
LYMPHOCYTES NFR BLD AUTO: 21 %
MAGNESIUM SERPL-MCNC: 2.1 MG/DL (ref 1.7–2.3)
MCH RBC QN AUTO: 32.3 PG (ref 26.5–33)
MCHC RBC AUTO-ENTMCNC: 36.9 G/DL (ref 31.5–36.5)
MCV RBC AUTO: 88 FL (ref 78–100)
MONOCYTES # BLD AUTO: 0.9 10E3/UL (ref 0–1.3)
MONOCYTES NFR BLD AUTO: 9 %
NEUTROPHILS # BLD AUTO: 6.3 10E3/UL (ref 1.6–8.3)
NEUTROPHILS NFR BLD AUTO: 67 %
NRBC # BLD AUTO: 0 10E3/UL
NRBC BLD AUTO-RTO: 0 /100
P AXIS - MUSE: NORMAL DEGREES
PLATELET # BLD AUTO: 301 10E3/UL (ref 150–450)
POTASSIUM SERPL-SCNC: 3.6 MMOL/L (ref 3.4–5.3)
PR INTERVAL - MUSE: NORMAL MS
QRS DURATION - MUSE: 84 MS
QT - MUSE: 294 MS
QTC - MUSE: 461 MS
R AXIS - MUSE: 15 DEGREES
RBC # BLD AUTO: 4.73 10E6/UL (ref 4.4–5.9)
SODIUM SERPL-SCNC: 133 MMOL/L (ref 135–145)
SYSTOLIC BLOOD PRESSURE - MUSE: 178 MMHG
T AXIS - MUSE: 69 DEGREES
T4 FREE SERPL-MCNC: 1.06 NG/DL (ref 0.9–1.7)
TROPONIN T SERPL HS-MCNC: 6 NG/L
TSH SERPL DL<=0.005 MIU/L-ACNC: 4.53 UIU/ML (ref 0.3–4.2)
VENTRICULAR RATE- MUSE: 148 BPM
WBC # BLD AUTO: 9.3 10E3/UL (ref 4–11)

## 2024-11-18 PROCEDURE — 84439 ASSAY OF FREE THYROXINE: CPT | Performed by: STUDENT IN AN ORGANIZED HEALTH CARE EDUCATION/TRAINING PROGRAM

## 2024-11-18 PROCEDURE — 93005 ELECTROCARDIOGRAM TRACING: CPT | Performed by: STUDENT IN AN ORGANIZED HEALTH CARE EDUCATION/TRAINING PROGRAM

## 2024-11-18 PROCEDURE — 250N000009 HC RX 250: Performed by: STUDENT IN AN ORGANIZED HEALTH CARE EDUCATION/TRAINING PROGRAM

## 2024-11-18 PROCEDURE — 85025 COMPLETE CBC W/AUTO DIFF WBC: CPT | Performed by: STUDENT IN AN ORGANIZED HEALTH CARE EDUCATION/TRAINING PROGRAM

## 2024-11-18 PROCEDURE — 85018 HEMOGLOBIN: CPT | Performed by: STUDENT IN AN ORGANIZED HEALTH CARE EDUCATION/TRAINING PROGRAM

## 2024-11-18 PROCEDURE — 36415 COLL VENOUS BLD VENIPUNCTURE: CPT | Performed by: STUDENT IN AN ORGANIZED HEALTH CARE EDUCATION/TRAINING PROGRAM

## 2024-11-18 PROCEDURE — 80048 BASIC METABOLIC PNL TOTAL CA: CPT | Performed by: STUDENT IN AN ORGANIZED HEALTH CARE EDUCATION/TRAINING PROGRAM

## 2024-11-18 PROCEDURE — 258N000003 HC RX IP 258 OP 636: Performed by: STUDENT IN AN ORGANIZED HEALTH CARE EDUCATION/TRAINING PROGRAM

## 2024-11-18 PROCEDURE — 96361 HYDRATE IV INFUSION ADD-ON: CPT

## 2024-11-18 PROCEDURE — 82310 ASSAY OF CALCIUM: CPT | Performed by: STUDENT IN AN ORGANIZED HEALTH CARE EDUCATION/TRAINING PROGRAM

## 2024-11-18 PROCEDURE — 83735 ASSAY OF MAGNESIUM: CPT | Performed by: STUDENT IN AN ORGANIZED HEALTH CARE EDUCATION/TRAINING PROGRAM

## 2024-11-18 PROCEDURE — 96374 THER/PROPH/DIAG INJ IV PUSH: CPT

## 2024-11-18 PROCEDURE — 84484 ASSAY OF TROPONIN QUANT: CPT | Performed by: STUDENT IN AN ORGANIZED HEALTH CARE EDUCATION/TRAINING PROGRAM

## 2024-11-18 PROCEDURE — 84443 ASSAY THYROID STIM HORMONE: CPT | Performed by: STUDENT IN AN ORGANIZED HEALTH CARE EDUCATION/TRAINING PROGRAM

## 2024-11-18 PROCEDURE — 250N000013 HC RX MED GY IP 250 OP 250 PS 637: Performed by: STUDENT IN AN ORGANIZED HEALTH CARE EDUCATION/TRAINING PROGRAM

## 2024-11-18 PROCEDURE — 99291 CRITICAL CARE FIRST HOUR: CPT | Mod: 25

## 2024-11-18 RX ORDER — HYDROCHLOROTHIAZIDE 12.5 MG/1
12.5 TABLET ORAL DAILY
Qty: 30 TABLET | Refills: 0 | Status: SHIPPED | OUTPATIENT
Start: 2024-11-18 | End: 2024-12-18

## 2024-11-18 RX ORDER — METOPROLOL TARTRATE 25 MG/1
50 TABLET, FILM COATED ORAL ONCE
Status: COMPLETED | OUTPATIENT
Start: 2024-11-18 | End: 2024-11-18

## 2024-11-18 RX ORDER — METOPROLOL TARTRATE 25 MG/1
50 TABLET, FILM COATED ORAL 2 TIMES DAILY
Qty: 120 TABLET | Refills: 0 | Status: SHIPPED | OUTPATIENT
Start: 2024-11-18 | End: 2024-11-19

## 2024-11-18 RX ORDER — METOPROLOL TARTRATE 1 MG/ML
5 INJECTION, SOLUTION INTRAVENOUS
Status: DISPENSED | OUTPATIENT
Start: 2024-11-18 | End: 2024-11-18

## 2024-11-18 RX ADMIN — METOPROLOL TARTRATE 5 MG: 5 INJECTION INTRAVENOUS at 14:45

## 2024-11-18 RX ADMIN — APIXABAN 5 MG: 5 TABLET, FILM COATED ORAL at 14:45

## 2024-11-18 RX ADMIN — SODIUM CHLORIDE 500 ML: 9 INJECTION, SOLUTION INTRAVENOUS at 14:49

## 2024-11-18 RX ADMIN — METOPROLOL TARTRATE 50 MG: 25 TABLET, FILM COATED ORAL at 15:01

## 2024-11-18 ASSESSMENT — ACTIVITIES OF DAILY LIVING (ADL)
ADLS_ACUITY_SCORE: 0

## 2024-11-18 NOTE — ED PROVIDER NOTES
EMERGENCY DEPARTMENT ENCOUNTER      NAME: Nato Garcia  AGE: 64 year old male  YOB: 1960  MRN: [id number]  EVALUATION DATE & TIME: 11/18/2024  1:29 PM    PCP: Florence Chavis (Inactive)    ED PROVIDER: Robert Mahan MD      Chief Complaint   Patient presents with    Atrial Fib         FINAL IMPRESSION:  1. Atrial fibrillation with RVR (H)          ED COURSE & MEDICAL DECISION MAKING:    Pertinent Labs & Imaging studies reviewed. (See chart for details)  64 year old male presents to the Emergency Department for evaluation of rapid heart rate    ED Course as of 11/18/24 1551   Mon Nov 18, 2024   1404 Patient is a 64-year-old male with history of hypertension who presents emergency department for evaluation of rapid and irregular heartbeat.  Patient states he did have 1 episode of previously reported atrial fibrillation several years ago but ultimately was not recurrent and he was not started on any anticoagulation at that point in time.  He does take metoprolol  twice daily 25 mg.  Otherwise on lisinopril, chlorothiazide.  Starting yesterday morning around 7 AM began noticed rapid heart rate on his Apple Watch and has persisted since.  Is been having rates between 140-160 beats a minute.  Does not have any lightheadedness associate with this.  Denies any chest pain or shortness of breath.  Did have a quadriceps tendon repair 4 weeks ago but has not noticed any worsening lower extremity edema.  No history of blood clots.    Exam here patient is notably tachycardic consistent with atrial fibrillation on the monitor.  He has an irregularly irregular tachycardic heart rate auscultation but warm and well-perfused extremities.  Lungs are clear without any wheezes or rales.  Patient is awake alert and mentating well.    Patient's been in atrial fibrillation seems like for almost 36 hours. we will send off a electrolytes as well as thyroid studies.  He does not have any chest pain or shortness of breath  and lower suspicion at this point time for pulmonary embolism as provoking factor of his atrial fibrillation.    Progress possibility of electrocardioversion versus rate control and patient is not certain this point in time.  Requested to speak with cardiology and have a page out to them discussed if once option or the other would be preferable   1533 After discussed with cardiology will proceed with rate control versus rhythm control.  Patient received single dose of 5 mg IV metoprolol here to 50 mg oral dose with improvement is rate now sustaining between 90 and 105 beats a minute however remains in atrial fibrillation.  Overall lab workup is reassuring here with very mild hyponatremia and borderline elevated TSH.  However free T4 within normal limits.  Troponin is reassuring at 6 and given duration of symptoms do not suspect ACS as cause of his A-fib.    Upon repeat evaluation patient remains well-appearing and denies any lightheadedness or dizziness chest pain or shortness of breath.  He feels comfortable with discharge home at this point in time.  With the patient medication changes.  Will increase metoprolol dosing to 50 mg twice daily and as a result decrease his hydrochlorothiazide dose to 12-1/2 mg daily as he currently states he is taking 25 mg daily.  He also received first dose of Eliquis in the emergency department will start him on 5 mg twice daily until he is able to follow-up with cardiology for which electronic referral was placed.  Patient expressed understanding and is comfortable wth this plan.  Discharged in stable condition.     1:48 PM I met and evaluated the patient.   2:07 PM Spoke with Dr. Finnegan, Cardiology, regarding patient plan of care.  2:11 PM Updated the patient on cardiologist recommendations.    Medical Decision Making  Obtained supplemental history:Supplemental history obtained?: Documented in chart and Family Member/Significant Other  Reviewed external records: External records  reviewed?: Documented in chart  Care impacted by chronic illness:Hypertension  Care significantly affected by social determinants of health:N/A  Did you consider but not order tests?: Work up considered but not performed and documented in chart, if applicable  Did you interpret images independently?: Independent interpretation of ECG and images noted in documentation, when applicable.  Consultation discussion with other provider:Did you involve another provider (consultant, , pharmacy, etc.)?: I discussed the care with another health care provider, see documentation for details.  Discharge. I prescribed additional prescription strength medication(s) as charted. I considered admission, but discharged patient after significant clinical improvement.  Not Applicable          At the conclusion of the encounter I discussed the results of all of the tests and the disposition. The questions were answered. The patient or family acknowledged understanding and was agreeable with the care plan.     32 minutes of critical care time     MEDICATIONS GIVEN IN THE EMERGENCY:  Medications   metoprolol (LOPRESSOR) injection 5 mg (5 mg Intravenous $Given 11/18/24 1445)   metoprolol tartrate (LOPRESSOR) tablet 50 mg (50 mg Oral $Given 11/18/24 1501)   apixaban ANTICOAGULANT (ELIQUIS) tablet 5 mg (5 mg Oral $Given 11/18/24 1445)   sodium chloride 0.9% BOLUS 500 mL (500 mLs Intravenous $New Bag 11/18/24 1449)       NEW PRESCRIPTIONS STARTED AT TODAY'S ER VISIT  New Prescriptions    APIXABAN ANTICOAGULANT (ELIQUIS) 5 MG TABLET    Take 1 tablet (5 mg) by mouth 2 times daily.    METOPROLOL TARTRATE (LOPRESSOR) 25 MG TABLET    Take 2 tablets (50 mg) by mouth 2 times daily.          =================================================================    HPI    Patient information was obtained from: Patient, spouse    Use of : N/A        Nato Garcia is a 64 year old male with a pertinent history of hypertension, who presents to  this ED by walk-in for evaluation of concern for atrial fibrillation.    Patient reports he was concerned he was in A-fib. He notes yesterday morning ~7800-2782, he noticed his heart rate was racing and more elevated than normal. He was checking his pulse occasionally on his Apple Watch and saw that at one point it was 120 BPM but indicates he felt like his HR was way more elevated. States 2 years ago, he was in A-fib. He's been feeling like his heart is beating irregularly for over a day now (~36 hours). He took several EKGs from his Apple Watch and sent it to his cardiologist, Dr. Uriarte, and he advised him to come to the ER. He doesn't have any chest pain or shortness of breath with this. He had right knee surgery because he torn his tendons in his quadricept 4 weeks ago. He wasn't put on aspirin and doesn't take any blood thinning medications since the surgery or prior surgery. His is currently on lisinopril, losartan, and tow other BP medications, per spouse. No recent fevers, nausea or vomiting. He endorses a decreased appetite since the surgery. No prior hz of blood clots. Reports Saturday night, he went to bed feeling fine. In the past, when he was in A-fib, this would only last 2-3 hours. He has no previous history of GI bleed, head bleed or other bleeding problems.    He stopped taking oxycodone 2 weeks ago and also stopped taking extra-strength Tylenol following the surgery. No other reported complaints or concerns at this time.      REVIEW OF SYSTEMS   Refer to the [facility]    PAST MEDICAL HISTORY:  No past medical history on file.    PAST SURGICAL HISTORY:  No past surgical history on file.        CURRENT MEDICATIONS:    apixaban ANTICOAGULANT (ELIQUIS) 5 MG tablet  hydrochlorothiazide 12.5 MG tablet  metoprolol tartrate (LOPRESSOR) 25 MG tablet  albuterol (PROAIR HFA/PROVENTIL HFA/VENTOLIN HFA) 108 (90 Base) MCG/ACT inhaler  aspirin 81 MG EC tablet  celecoxib (CELEBREX) 200 MG capsule  dicyclomine (BENTYL)  "20 MG tablet  lisinopril (ZESTRIL) 40 MG tablet  nystatin (MYCOSTATIN) 555486 UNIT/GM external cream  SYMBICORT 160-4.5 MCG/ACT Inhaler  traZODone (DESYREL) 100 MG tablet        ALLERGIES:  No Known Allergies    FAMILY HISTORY:  No family history on file.    SOCIAL HISTORY:   Social History     Socioeconomic History    Marital status:    Tobacco Use    Smoking status: Never    Smokeless tobacco: Never   Vaping Use    Vaping status: Never Used   Substance and Sexual Activity    Drug use: Never       VITALS:  /70   Pulse 95   Temp 98.1  F (36.7  C)   Resp 14   Ht 1.753 m (5' 9\")   Wt 81.6 kg (180 lb)   SpO2 97%   BMI 26.58 kg/m      PHYSICAL EXAM    Constitutional: Well developed, Well nourished, NAD,   HENT: Normocephalic, Atraumatic,  mucous membranes moist,   Neck- trachea midline, No stridor.    Eyes:EOMI, Conjunctiva normal, No discharge.   Respiratory: Normal breath sounds, No respiratory distress, No wheezing.    Cardiovascular: Tachycardic, Irregular rhythm, No murmurs, Well-perfused extremities.    Abdominal: Soft, No tenderness, No rebound or guarding.     Musculoskeletal: no deformity or malalignment   Integument: Warm, Dry, No erythema,    Neurologic: Alert & oriented x 3   Psychiatric: Affect normal, Cooperative.      LAB:  All pertinent labs reviewed and interpreted.  Results for orders placed or performed during the hospital encounter of 11/18/24   Basic metabolic panel   Result Value Ref Range    Sodium 133 (L) 135 - 145 mmol/L    Potassium 3.6 3.4 - 5.3 mmol/L    Chloride 97 (L) 98 - 107 mmol/L    Carbon Dioxide (CO2) 22 22 - 29 mmol/L    Anion Gap 14 7 - 15 mmol/L    Urea Nitrogen 19.7 8.0 - 23.0 mg/dL    Creatinine 0.83 0.67 - 1.17 mg/dL    GFR Estimate >90 >60 mL/min/1.73m2    Calcium 10.2 8.8 - 10.4 mg/dL    Glucose 116 (H) 70 - 99 mg/dL   Result Value Ref Range    Magnesium 2.1 1.7 - 2.3 mg/dL   Result Value Ref Range    Troponin T, High Sensitivity 6 <=22 ng/L   TSH with free " T4 reflex   Result Value Ref Range    TSH 4.53 (H) 0.30 - 4.20 uIU/mL   CBC with platelets and differential   Result Value Ref Range    WBC Count 9.3 4.0 - 11.0 10e3/uL    RBC Count 4.73 4.40 - 5.90 10e6/uL    Hemoglobin 15.3 13.3 - 17.7 g/dL    Hematocrit 41.5 40.0 - 53.0 %    MCV 88 78 - 100 fL    MCH 32.3 26.5 - 33.0 pg    MCHC 36.9 (H) 31.5 - 36.5 g/dL    RDW 11.6 10.0 - 15.0 %    Platelet Count 301 150 - 450 10e3/uL    % Neutrophils 67 %    % Lymphocytes 21 %    % Monocytes 9 %    % Eosinophils 2 %    % Basophils 0 %    % Immature Granulocytes 0 %    NRBCs per 100 WBC 0 <1 /100    Absolute Neutrophils 6.3 1.6 - 8.3 10e3/uL    Absolute Lymphocytes 2.0 0.8 - 5.3 10e3/uL    Absolute Monocytes 0.9 0.0 - 1.3 10e3/uL    Absolute Eosinophils 0.2 0.0 - 0.7 10e3/uL    Absolute Basophils 0.0 0.0 - 0.2 10e3/uL    Absolute Immature Granulocytes 0.0 <=0.4 10e3/uL    Absolute NRBCs 0.0 10e3/uL   Result Value Ref Range    Free T4 1.06 0.90 - 1.70 ng/dL   ECG 12-LEAD WITH MUSE (LHE)   Result Value Ref Range    Systolic Blood Pressure 178 mmHg    Diastolic Blood Pressure 89 mmHg    Ventricular Rate 148 BPM    Atrial Rate 153 BPM    PA Interval  ms    QRS Duration 84 ms     ms    QTc 461 ms    P Axis  degrees    R AXIS 15 degrees    T Axis 69 degrees    Interpretation ECG       Atrial fibrillation with rapid ventricular response  Septal infarct , age undetermined  Abnormal ECG  No previous ECGs available  Confirmed by SEE ED PROVIDER NOTE FOR, ECG INTERPRETATION ([id number]),  JOCELYN HEBERT ([id number]) on 11/18/2024 2:45:07 PM         RADIOLOGY:  Reviewed all pertinent imaging. Please see official radiology report.  No orders to display       EKG:    Performed at:     Impression: EKG shows atrial fibrillation rapid ventricular response rate of 148 beats a minute, normal axis, no ST elevation or acute ischemic T wave changes.        I have independently reviewed and interpreted the EKG(s) documented above.    PROCEDURES:          [facility] Documentation:   CMS Diagnoses:              I, Maxine Woodward, am serving as a scribe to document services personally performed by Robert Mahan MD based on my observation and the provider's statements to me. I, Robert Mahan MD, attest that Maxine Woodward is acting in a scribe capacity, has observed my performance of the services and has documented them in accordance with my direction.    Robert Mahan MD  [facility] EMERGENCY ROOM  [address] [address]  [phone]      Robert Mahan MD  11/18/24 6304

## 2024-11-18 NOTE — DISCHARGE INSTRUCTIONS
You were in atrial fibrillation rapid ventricular response today.   your heart rate has improved and her goal heart rate is less than 110.  We will increase your metoprolol dose to 50 mg twice a day from your current 25 mg twice a day.  As a result you should decrease your hydrochlorothiazide by half So take 12.5 mg of daily.  Additionally you will need to follow-up with cardiology for which referrals been placed and they will likely contact you in the next few days to schedule follow-up appointment.  You have also been started on a medication called Eliquis which will help prevent blood clots related to atrial fibrillation.  This medication does put you at increased risk of bleeding and therefore if you have any significant injuries including falls or trauma to the head you should have a low threshold to be evaluated the emergency department.  When taking Eliquis he should also avoid NSAIDs (medications like ibuprofen and aspirin).

## 2024-11-18 NOTE — TELEPHONE ENCOUNTER
Pt called in, confirmed recommendations. He will be going to [facility].      [facility] for Pt regarding recommendations, responded via [employer] as well as unable to leave a detailed message for Pt.       From: Uriel Uriarte MD  Sent: 11/18/2024  12:27 PM CST  To: Jackie Escalante  Subject: FW: Adfib                                        He should go to the emergency room for an EKG and could even possibly be cardioverted.  Thanks,    Uriel

## 2024-11-18 NOTE — ED TRIAGE NOTES
Pt here with concern for a fib. States he noticed his heart going fast yesterday am around [id number]. Pt called Dr Uriarte and he told him to come in. Pt denies any chest pain. Had knee surgery one month ago for a torn tendon. Denies blood thinner use.

## 2024-11-19 ENCOUNTER — OFFICE VISIT (OUTPATIENT)
Dept: CARDIOLOGY | Facility: CLINIC | Age: 64
End: 2024-11-19
Payer: COMMERCIAL

## 2024-11-19 VITALS
HEART RATE: 73 BPM | RESPIRATION RATE: 18 BRPM | HEIGHT: 69 IN | SYSTOLIC BLOOD PRESSURE: 124 MMHG | WEIGHT: 186.4 LBS | DIASTOLIC BLOOD PRESSURE: 62 MMHG | OXYGEN SATURATION: 99 % | BODY MASS INDEX: 27.61 KG/M2

## 2024-11-19 DIAGNOSIS — I48.91 ATRIAL FIBRILLATION WITH RVR (H): ICD-10-CM

## 2024-11-19 LAB
ATRIAL RATE - MUSE: 76 BPM
DIASTOLIC BLOOD PRESSURE - MUSE: NORMAL MMHG
INTERPRETATION ECG - MUSE: NORMAL
P AXIS - MUSE: 40 DEGREES
PR INTERVAL - MUSE: 202 MS
QRS DURATION - MUSE: 100 MS
QT - MUSE: 410 MS
QTC - MUSE: 461 MS
R AXIS - MUSE: -24 DEGREES
SYSTOLIC BLOOD PRESSURE - MUSE: NORMAL MMHG
T AXIS - MUSE: 10 DEGREES
VENTRICULAR RATE- MUSE: 76 BPM

## 2024-11-19 PROCEDURE — 99205 OFFICE O/P NEW HI 60 MIN: CPT | Performed by: INTERNAL MEDICINE

## 2024-11-19 PROCEDURE — 93000 ELECTROCARDIOGRAM COMPLETE: CPT | Performed by: INTERNAL MEDICINE

## 2024-11-19 PROCEDURE — G2211 COMPLEX E/M VISIT ADD ON: HCPCS | Performed by: INTERNAL MEDICINE

## 2024-11-19 RX ORDER — DOXAZOSIN 8 MG/1
1 TABLET ORAL AT BEDTIME
COMMUNITY
Start: 2024-10-12

## 2024-11-19 RX ORDER — AMLODIPINE BESYLATE 10 MG/1
10 TABLET ORAL DAILY
COMMUNITY
Start: 2024-11-18

## 2024-11-19 RX ORDER — FLUTICASONE FUROATE AND VILANTEROL TRIFENATATE 200; 25 UG/1; UG/1
1 POWDER RESPIRATORY (INHALATION) DAILY
COMMUNITY
Start: 2024-11-18

## 2024-11-19 RX ORDER — METOPROLOL SUCCINATE 100 MG/1
100 TABLET, EXTENDED RELEASE ORAL DAILY
Qty: 30 TABLET | Refills: 12 | Status: SHIPPED | OUTPATIENT
Start: 2024-11-19

## 2024-11-19 NOTE — LETTER
11/19/2024    Roverto Herbert MD, MD  [address]  [address] [place] [address]    RE: Nato Garcia       Dear Colleague,     I had the pleasure of seeing Nato Garcia in the [facility].    HEART CARE VALVE CLINIC ENCOUNTER CONSULTATON NOTE      [facility]  [phone]      Assessment/Recommendations   Assessment/Plan: Nato Garcia is 64M w/ AF, HTN, asthma who is  here after an EF visit for AF w/ RVR.    -  I personally reviewed cardiac imaging including: TTE/stress echo from '22 w/ preserved LVEF no valvular disease  -  EKG today w/   -  CHADSVASC =2 for age and HTN  -  continue apixaban initiated in clinic, change BB to once daily formulation  -  check a TTE to rule out new ventricular or valvular abnormality  -  f/up in AF clinic for consideration of antiarrhythmic therapy, PVI/LAAO options    The longitudinal plan of care for the diagnosis(es)/condition(s) as documented were addressed during this visit. Due to the added complexity in care, I will continue to support Nato in the subsequent management and with ongoing continuity of care.      F/up in AF clinic  F/up w/  in 3 mos    A total of >60 mins was spent reviewing prior records, including documentation, lab studies, cardiac testing/imaging, interview with patient, physical exam, and documentation     History of Present Illness/Subjective    HPI: Nato Garcia is a 64 year old male w/ AF, HTN, asthma who is  here after an EF visit for AF w/ RVR.    He has apparently had an episode years ago but not on rate control or anticoagulation. Another recurrence over the past 3 days, seen in the ED yesterday after 36 hours in AF. Labs w/ hsTnT that was normal, normal T4, mildly elevated TSH. Managed w/ rate control and started on anticoagulation. This morning he went back to NSR at a HR 70's   He mostly notices AF on Apple watch but when HR's get fast enough, he can usually tell  symptomatically.  No CP/SOB/ZIMMERMAN/orthopnea/PND/edema/syncope/N/V/D/F/wt.changes.   He has a knee surgery 4 weeks ago.    Recent Echocardiogram Results Stress echo '22:  1. Normal stress echocardiogram without evidence of stress induced ischemia.  2. Normal resting LV systolic performance with an ejection fraction of 55-60%.  There is normal improvement in left ventricular systolic performance with a  peak ejection fraction of 70-75%.  3. No ECG evidence of ischemia.  4. No anginal chest pain reported with exercise.  5. Excellent functional capacity for age. METS:12    Recent Coronary Angiogram Results:  Npone       Physical Examination  Review of Systems   Vitals: There were no vitals taken for this visit.  BMI= There is no height or weight on file to calculate BMI.  Wt Readings from Last 3 Encounters:   11/18/24 81.6 kg (180 lb)   10/27/22 84.1 kg (185 lb 4.8 oz)       General Appearance:   no distress, normal body habitus   ENT/Mouth: membranes moist, no oral lesions or bleeding gums.      EYES:  no scleral icterus, normal conjunctivae   Neck: no carotid bruits or thyromegaly   Chest/Lungs:   lungs are clear to auscultation, no rales or wheezing, no sternal scar, equal chest wall expansion    Cardiovascular:   Regular. Normal first and second heart sounds with no systolic murmur, no rubs, or gallops; the carotid, radial and posterior tibial pulses are intact, Jugular venous pressure 6, no edema bilaterally    Abdomen:  no organomegaly, masses, bruits, or tenderness; bowel sounds are present   Extremities: no cyanosis or clubbing   Skin: no xanthelasma, warm.    Neurologic: normal  bilateral, no tremors     Psychiatric: alert and oriented x3, calm        Please refer above for cardiac ROS details.        Medical History  Surgical History Family History Social History   No past medical history on file.  No past surgical history on file.  No family history on file.     Social History     Socioeconomic History      Marital status:      Spouse name: Not on file     Number of children: Not on file     Years of education: Not on file     Highest education level: Not on file   Occupational History     Not on file   Tobacco Use     Smoking status: Never     Smokeless tobacco: Never   Vaping Use     Vaping status: Never Used   Substance and Sexual Activity     Alcohol use: Not on file     Drug use: Never     Sexual activity: Not on file   Other Topics Concern     Not on file   Social History Narrative     Not on file     Social Drivers of Health     Financial Resource Strain: Not on file   Food Insecurity: Not on file   Transportation Needs: Not on file   Physical Activity: Not on file   Stress: Not on file   Social Connections: Not on file   Interpersonal Safety: Not on file   Housing Stability: Not on file           Medications  Allergies   Current Outpatient Medications   Medication Sig Dispense Refill     albuterol (PROAIR HFA/PROVENTIL HFA/VENTOLIN HFA) 108 (90 Base) MCG/ACT inhaler INHALE 2 PUFFS 4 TIMES A DAY AS NEEDED       amLODIPine (NORVASC) 10 MG tablet Take 10 mg by mouth daily.       apixaban ANTICOAGULANT (ELIQUIS) 5 MG tablet Take 1 tablet (5 mg) by mouth 2 times daily. 60 tablet 0     BREO ELLIPTA 200-25 MCG/ACT inhaler Inhale 1 puff into the lungs daily.       celecoxib (CELEBREX) 200 MG capsule TAKE 1 CAPSULE BY MOUTH TWICE A DAY AS NEEDED       dicyclomine (BENTYL) 20 MG tablet Take 2 tablets by mouth 2 times daily       doxazosin (CARDURA) 8 MG tablet Take 1 tablet by mouth at bedtime.       hydrochlorothiazide 12.5 MG tablet Take 1 tablet (12.5 mg) by mouth daily. 30 tablet 0     lisinopril (ZESTRIL) 40 MG tablet Take 40 mg by mouth daily       metoprolol tartrate (LOPRESSOR) 25 MG tablet Take 2 tablets (50 mg) by mouth 2 times daily. 120 tablet 0     nystatin (MYCOSTATIN) 282788 UNIT/GM external cream APPLY TO AFFECTED AREA 3 TIMES A DAY 15       traZODone (DESYREL) 100 MG tablet Take 100 mg by mouth At  "Bedtime       No Known Allergies       Lab Results    Chemistry/lipid CBC Cardiac Enzymes/BNP/TSH/INR   Recent Labs   Lab Test 10/24/23  1144   CHOL 200*   HDL 50   *   TRIG 122     Recent Labs   Lab Test 10/24/23  1144 10/05/22  1044 11/15/21  1532   * 137* 107     Recent Labs   Lab Test 11/18/24  1344   *   POTASSIUM 3.6   CHLORIDE 97*   CO2 22   *   BUN 19.7   CR 0.83   GFRESTIMATED >90   ANDIE 10.2     Recent Labs   Lab Test 11/18/24  1344 10/08/24  1027 09/17/24  1429   CR 0.83 1.18* 1.00     No results for input(s): \"A1C\" in the last 53660 hours.       Recent Labs   Lab Test 11/18/24  1344   WBC 9.3   HGB 15.3   HCT 41.5   MCV 88        Recent Labs   Lab Test 11/18/24  1344   HGB 15.3    No results for input(s): \"TROPONINI\" in the last 94848 hours.  No results for input(s): \"BNP\", \"NTBNPI\", \"NTBNP\" in the last 21539 hours.  Recent Labs   Lab Test 11/18/24  1344   TSH 4.53*     No results for input(s): \"INR\" in the last 02426 hours.     Ismael Rebolledo MD                                        Thank you for allowing me to participate in the care of your patient.      Sincerely,     Ismael Rebolledo MD     [facility]  cc:   Robert Mahan MD  [facility] [address] [place] [id number]      "

## 2024-11-19 NOTE — PROGRESS NOTES
HEART CARE VALVE CLINIC ENCOUNTER CONSULTATON NOTE      [facility]  [phone]      Assessment/Recommendations   Assessment/Plan: Nato Garcia is 64M w/ AF, HTN, asthma who is  here after an EF visit for AF w/ RVR.    -  I personally reviewed cardiac imaging including: TTE/stress echo from '22 w/ preserved LVEF no valvular disease  -  EKG today w/   -  CHADSVASC =2 for age and HTN  -  continue apixaban initiated in clinic, change BB to once daily formulation  -  check a TTE to rule out new ventricular or valvular abnormality  -  f/up in AF clinic for consideration of antiarrhythmic therapy, PVI/LAAO options    The longitudinal plan of care for the diagnosis(es)/condition(s) as documented were addressed during this visit. Due to the added complexity in care, I will continue to support Nato in the subsequent management and with ongoing continuity of care.      F/up in AF clinic  F/up w/  in 3 mos    A total of >60 mins was spent reviewing prior records, including documentation, lab studies, cardiac testing/imaging, interview with patient, physical exam, and documentation     History of Present Illness/Subjective    HPI: Nato Garcia is a 64 year old male w/ AF, HTN, asthma who is  here after an EF visit for AF w/ RVR.    He has apparently had an episode years ago but not on rate control or anticoagulation. Another recurrence over the past 3 days, seen in the ED yesterday after 36 hours in AF. Labs w/ hsTnT that was normal, normal T4, mildly elevated TSH. Managed w/ rate control and started on anticoagulation. This morning he went back to NSR at a HR 70's   He mostly notices AF on Apple watch but when HR's get fast enough, he can usually tell symptomatically.  No CP/SOB/ZIMMERMAN/orthopnea/PND/edema/syncope/N/V/D/F/wt.changes.   He has a knee surgery 4 weeks ago.    Recent Echocardiogram Results Stress echo '22:  1. Normal stress echocardiogram without evidence of stress induced  ischemia.  2. Normal resting LV systolic performance with an ejection fraction of 55-60%.  There is normal improvement in left ventricular systolic performance with a  peak ejection fraction of 70-75%.  3. No ECG evidence of ischemia.  4. No anginal chest pain reported with exercise.  5. Excellent functional capacity for age. METS:12    Recent Coronary Angiogram Results:  Npone       Physical Examination  Review of Systems   Vitals: There were no vitals taken for this visit.  BMI= There is no height or weight on file to calculate BMI.  Wt Readings from Last 3 Encounters:   11/18/24 81.6 kg (180 lb)   10/27/22 84.1 kg (185 lb 4.8 oz)       General Appearance:   no distress, normal body habitus   ENT/Mouth: membranes moist, no oral lesions or bleeding gums.      EYES:  no scleral icterus, normal conjunctivae   Neck: no carotid bruits or thyromegaly   Chest/Lungs:   lungs are clear to auscultation, no rales or wheezing, no sternal scar, equal chest wall expansion    Cardiovascular:   Regular. Normal first and second heart sounds with no systolic murmur, no rubs, or gallops; the carotid, radial and posterior tibial pulses are intact, Jugular venous pressure 6, no edema bilaterally    Abdomen:  no organomegaly, masses, bruits, or tenderness; bowel sounds are present   Extremities: no cyanosis or clubbing   Skin: no xanthelasma, warm.    Neurologic: normal  bilateral, no tremors     Psychiatric: alert and oriented x3, calm        Please refer above for cardiac ROS details.        Medical History  Surgical History Family History Social History   No past medical history on file.  No past surgical history on file.  No family history on file.     Social History     Socioeconomic History    Marital status:      Spouse name: Not on file    Number of children: Not on file    Years of education: Not on file    Highest education level: Not on file   Occupational History    Not on file   Tobacco Use    Smoking status:  Never    Smokeless tobacco: Never   Vaping Use    Vaping status: Never Used   Substance and Sexual Activity    Alcohol use: Not on file    Drug use: Never    Sexual activity: Not on file   Other Topics Concern    Not on file   Social History Narrative    Not on file     Social Drivers of Health     Financial Resource Strain: Not on file   Food Insecurity: Not on file   Transportation Needs: Not on file   Physical Activity: Not on file   Stress: Not on file   Social Connections: Not on file   Interpersonal Safety: Not on file   Housing Stability: Not on file           Medications  Allergies   Current Outpatient Medications   Medication Sig Dispense Refill    albuterol (PROAIR HFA/PROVENTIL HFA/VENTOLIN HFA) 108 (90 Base) MCG/ACT inhaler INHALE 2 PUFFS 4 TIMES A DAY AS NEEDED      amLODIPine (NORVASC) 10 MG tablet Take 10 mg by mouth daily.      apixaban ANTICOAGULANT (ELIQUIS) 5 MG tablet Take 1 tablet (5 mg) by mouth 2 times daily. 60 tablet 0    BREO ELLIPTA 200-25 MCG/ACT inhaler Inhale 1 puff into the lungs daily.      celecoxib (CELEBREX) 200 MG capsule TAKE 1 CAPSULE BY MOUTH TWICE A DAY AS NEEDED      dicyclomine (BENTYL) 20 MG tablet Take 2 tablets by mouth 2 times daily      doxazosin (CARDURA) 8 MG tablet Take 1 tablet by mouth at bedtime.      hydrochlorothiazide 12.5 MG tablet Take 1 tablet (12.5 mg) by mouth daily. 30 tablet 0    lisinopril (ZESTRIL) 40 MG tablet Take 40 mg by mouth daily      metoprolol tartrate (LOPRESSOR) 25 MG tablet Take 2 tablets (50 mg) by mouth 2 times daily. 120 tablet 0    nystatin (MYCOSTATIN) 758987 UNIT/GM external cream APPLY TO AFFECTED AREA 3 TIMES A DAY 15      traZODone (DESYREL) 100 MG tablet Take 100 mg by mouth At Bedtime       No Known Allergies       Lab Results    Chemistry/lipid CBC Cardiac Enzymes/BNP/TSH/INR   Recent Labs   Lab Test 10/24/23  1144   CHOL 200*   HDL 50   *   TRIG 122     Recent Labs   Lab Test 10/24/23  1144 10/05/22  1044 11/15/21  1532  "  * 137* 107     Recent Labs   Lab Test 11/18/24  1344   *   POTASSIUM 3.6   CHLORIDE 97*   CO2 22   *   BUN 19.7   CR 0.83   GFRESTIMATED >90   ANDIE 10.2     Recent Labs   Lab Test 11/18/24  1344 10/08/24  1027 09/17/24  1429   CR 0.83 1.18* 1.00     No results for input(s): \"A1C\" in the last 08560 hours.       Recent Labs   Lab Test 11/18/24  1344   WBC 9.3   HGB 15.3   HCT 41.5   MCV 88        Recent Labs   Lab Test 11/18/24  1344   HGB 15.3    No results for input(s): \"TROPONINI\" in the last 04779 hours.  No results for input(s): \"BNP\", \"NTBNPI\", \"NTBNP\" in the last 71013 hours.  Recent Labs   Lab Test 11/18/24  1344   TSH 4.53*     No results for input(s): \"INR\" in the last 36906 hours.     Ismael Rebolledo MD                                      "

## 2024-11-22 ENCOUNTER — HOSPITAL ENCOUNTER (OUTPATIENT)
Dept: CARDIOLOGY | Facility: CLINIC | Age: 64
Discharge: HOME | End: 2024-11-22
Attending: INTERNAL MEDICINE | Admitting: INTERNAL MEDICINE
Payer: COMMERCIAL

## 2024-11-22 DIAGNOSIS — I48.91 ATRIAL FIBRILLATION WITH RVR (H): ICD-10-CM

## 2024-11-22 PROCEDURE — 93306 TTE W/DOPPLER COMPLETE: CPT

## 2024-11-22 PROCEDURE — 93306 TTE W/DOPPLER COMPLETE: CPT | Mod: 26 | Performed by: INTERNAL MEDICINE

## 2025-01-08 ENCOUNTER — OFFICE VISIT (OUTPATIENT)
Dept: CARDIOLOGY | Facility: CLINIC | Age: 65
End: 2025-01-08
Payer: COMMERCIAL

## 2025-01-08 VITALS
DIASTOLIC BLOOD PRESSURE: 72 MMHG | HEART RATE: 68 BPM | SYSTOLIC BLOOD PRESSURE: 132 MMHG | HEIGHT: 69 IN | WEIGHT: 184.1 LBS | BODY MASS INDEX: 27.27 KG/M2 | RESPIRATION RATE: 12 BRPM

## 2025-01-08 DIAGNOSIS — I48.0 PAROXYSMAL ATRIAL FIBRILLATION (H): Primary | ICD-10-CM

## 2025-01-08 DIAGNOSIS — I48.91 ATRIAL FIBRILLATION WITH RVR (H): ICD-10-CM

## 2025-01-08 PROCEDURE — 99215 OFFICE O/P EST HI 40 MIN: CPT | Performed by: INTERNAL MEDICINE

## 2025-01-08 PROCEDURE — 99417 PROLNG OP E/M EACH 15 MIN: CPT | Performed by: INTERNAL MEDICINE

## 2025-01-08 RX ORDER — AMLODIPINE BESYLATE 5 MG/1
1 TABLET ORAL
COMMUNITY
Start: 2025-01-04

## 2025-01-08 RX ORDER — TADALAFIL 20 MG/1
20 TABLET ORAL PRN
COMMUNITY
Start: 2024-08-12

## 2025-01-08 RX ORDER — HYDROCHLOROTHIAZIDE 12.5 MG/1
12.5 TABLET ORAL DAILY
Qty: 30 TABLET | Refills: 0 | Status: SHIPPED | OUTPATIENT
Start: 2025-01-08

## 2025-01-08 RX ORDER — METOPROLOL TARTRATE 50 MG
50 TABLET ORAL EVERY 6 HOURS PRN
Qty: 20 TABLET | Refills: 1 | Status: SHIPPED | OUTPATIENT
Start: 2025-01-08

## 2025-01-08 NOTE — PROGRESS NOTES
[facility]  Cardiac Electrophysiology  [address]  [place] [address]   Office: [phone]  Fax: [phone]     Patient: Nato Garcia   : 1960       CHIEF COMPLAINT/REASON FOR VISIT  Paroxysmal atrial fibrillation      Assessment/Recommendations     [id number]/Paroxysmal atrial fibrillation - symptomatic, associated with increased risk of stroke, heart failure and mortality  [id number] 2  We reviewed atrial fibrillation, managing stroke risk via anticoagulation or LAAO, managing heart failure risk, rate control, cardioversion, antiarrhythmic drug therapy, and catheter ablation.  We discussed atrial fibrillation ablation procedures, anticipated success rates, the potential need for re-do ablation vs addition of anti-arrhythmic drugs, procedural risks (including groin bleeding, vascular injury, tamponade, phrenic or esophageal injury, stroke, pulmonary vein stenosis) and recovery expectations.  He would prefer consider options and will contact us with questions or decision as to how he would like to proceed.  - metoprolol 50mg every 6hrs as needed for atrial fibrillation  - continue metoprolol XL 100mg daily  - continue apixaban 5mg twice daily.  He would prefer to avoid long term anticoagulation.  Though he would not qualify at present, we discussed pLAAO - he will consider further.  We can consider further [id number] assessment via coronary CTA.  - we discussed the ongoing importance of lifestyle modification (maintaining a healthy weight, aerobic activity, sleep apnea diagnosis and management, alcohol avoidance) as part of a long term strategy for atrial fibrillation management    Follow up: as above           History of Present Illness   Nato Garcia is a 64 year old male with paroxysmal atrial fibrillation, HTN referred by Dr. Rebolledo for consultation regarding atrial fibrillation.    Mr. Garcia's atrial fibrillation history is as summarized  "below:  Symptoms: palpitations  Symptom onset date: 2021  Diagnosis date: 2021 (Apple Watch)  Admissions/ER visits: 11/18/2024 - self-limited AF ~48hrs  Prior medical therapies: no I, III AADs  Prior DCCVs: none  Prior ablations: none  Percutaneous left atrial appendage occlusion: none    He notes no known AF recurrences beyond the 2021 and 11/18/2024 episodes.  He exercises regularly.  He denies chest pain, syncope.       Physical Examination  Review of Systems   VITALS: /72 (BP Location: Left arm, Patient Position: Sitting, Cuff Size: Adult Large)   Pulse 68   Resp 12   Ht 1.753 m (5' 9\")   Wt 83.5 kg (184 lb 1.6 oz)   BMI 27.19 kg/m    Wt Readings from Last 3 Encounters:   11/19/24 84.6 kg (186 lb 6.4 oz)   11/18/24 81.6 kg (180 lb)   10/27/22 84.1 kg (185 lb 4.8 oz)     CONSTITUTIONAL: well nourished, comfortable, no distress  EYES:  Conjunctivae pink, sclerae clear.    E/N/T:  Oral mucosa pink  RESPIRATORY:  Respiratory effort is normal  CARDIOVASCULAR:  normal S1 and S2  GASTROINTESTINAL:  Abdomen without masses or tenderness  EXTREMITIES:  No clubbing or cyanosis.    MUSCULOSKELETAL:  Overall grossly normal muscle strength  SKIN:  Overall, skin warm and dry, no lesions.  NEURO/PSYCH:  Oriented x 3 with normal affect.   Constitutional:  No weight loss or loss of appetite    Eyes:  No difficulty with vision, no double vision, no dry eyes  ENT:  No sore throat, difficulty swallowing; changes in hearing or tinnitus  Cardiovascular: As detailed above  Respiratory:  No cough  Musculoskeletal  No joint pain, muscle aches  Neurologic:  No syncope, lightheadedness, fainting spells   Hematologic: No easy bruising, excessive bleeding tendency   Gastrointestinal:  No jaundice, abdominal pain or abdominal bloating  Genitourinary: No changes in urinary habits, no trouble urinating    Psychiatric: No anxiety or depression      Medical History  Surgical History   No past medical history on file. No past surgical " history on file.      Family History Social History   No family history on file.     Social History     Tobacco Use    Smoking status: Never     Passive exposure: Never    Smokeless tobacco: Never   Vaping Use    Vaping status: Never Used   Substance Use Topics    Drug use: Never         Medications  Allergies     Current Outpatient Medications:     albuterol (PROAIR HFA/PROVENTIL HFA/VENTOLIN HFA) 108 (90 Base) MCG/ACT inhaler, INHALE 2 PUFFS 4 TIMES A DAY AS NEEDED, Disp: , Rfl:     amLODIPine (NORVASC) 10 MG tablet, Take 10 mg by mouth daily., Disp: , Rfl:     apixaban ANTICOAGULANT (ELIQUIS) 5 MG tablet, Take 1 tablet (5 mg) by mouth 2 times daily., Disp: 60 tablet, Rfl: 1    BREO ELLIPTA 200-25 MCG/ACT inhaler, Inhale 1 puff into the lungs daily., Disp: , Rfl:     celecoxib (CELEBREX) 200 MG capsule, TAKE 1 CAPSULE BY MOUTH TWICE A DAY AS NEEDED, Disp: , Rfl:     dicyclomine (BENTYL) 20 MG tablet, Take 2 tablets by mouth 2 times daily, Disp: , Rfl:     doxazosin (CARDURA) 8 MG tablet, Take 1 tablet by mouth at bedtime., Disp: , Rfl:     hydrochlorothiazide 12.5 MG tablet, Take 1 tablet (12.5 mg) by mouth daily., Disp: 30 tablet, Rfl: 0    lisinopril (ZESTRIL) 40 MG tablet, Take 40 mg by mouth daily, Disp: , Rfl:     metoprolol succinate ER (TOPROL XL) 100 MG 24 hr tablet, Take 1 tablet (100 mg) by mouth daily., Disp: 30 tablet, Rfl: 12    nystatin (MYCOSTATIN) 981893 UNIT/GM external cream, APPLY TO AFFECTED AREA 3 TIMES A DAY 15, Disp: , Rfl:     traZODone (DESYREL) 100 MG tablet, Take 100 mg by mouth At Bedtime, Disp: , Rfl:    No Known Allergies       Lab Results    Chemistry CBC Cardiac Enzymes/BNP/TSH/INR   Recent Labs   Lab Test 11/18/24  1344   *   POTASSIUM 3.6   CHLORIDE 97*   CO2 22   *   BUN 19.7   CR 0.83   GFRESTIMATED >90   ANDIE 10.2     Recent Labs   Lab Test 11/18/24  1344 10/08/24  1027 09/17/24  1429   CR 0.83 1.18* 1.00          Recent Labs   Lab Test 11/18/24  1344   WBC 9.3  "  HGB 15.3   HCT 41.5   MCV 88        Recent Labs   Lab Test 11/18/24  1344   HGB 15.3    No results for input(s): \"TROPONINI\" in the last 65646 hours.  No results for input(s): \"BNP\", \"NTBNPI\", \"NTBNP\" in the last 60452 hours.  Recent Labs   Lab Test 11/18/24  1344   TSH 4.53*     No results for input(s): \"INR\" in the last 65141 hours.      Data Review    ECGs (tracings independently reviewed)  11/19/2024 - SR 76bpm, QTC 461ms  11/18/2024 - AF, 148bpm    11/22/2024 TTE  1. Normal left ventricular size and systolic performance with a visually  estimated ejection fraction of 60-65%.  2. There is trace aortic insufficiency.  3. Normal right ventricular size and systolic performance.       55 minutes was spent reviewing the chart and in direct discussion with the patient.    Cc: Ismael Rebolledo MD, Roverto Herbert MD Amila Dilusha William, MD  1/8/2025  2:12 PM      "

## 2025-01-08 NOTE — PATIENT INSTRUCTIONS
[facility]  Cardiac Electrophysiology  [address]  [place] [address]   Office: [phone]  Fax: [phone]     Thank you for seeing us in clinic today - it is a pleasure to be a part of your care team.  Below is a summary of our plan from today's visit.       You have atrial fibrillation.  We reviewed atrial fibrillation, treatment options (ablation vs antiarrhythmic drug therapy), and long term stroke risk prevention (blood thinner vs Watchman device).    We will plan for the following:  - consider options further, and let us know with any questions or decision as to how you would like to proceed  - metoprolol 50mg every 6hrs as needed for atrial fibrillation  - continue metoprolol XL 100mg daily  - continue apixaban 5mg twice daily     Please do not hesitate to be in touch with our office at [phone] with any questions that may arise.       Thank you for trusting us with your care,    Blessing Chua MD  Clinical Cardiac Electrophysiology  [facility]  [address] [place] [address]   Office: [phone]  Fax: [phone]        ATRIAL FIBRILLATION: Patient Information    What is atrial fibrillation?  Atrial fibrillation (AF, A-fib) is a common heart rhythm problem (arrhythmia) occurring within the upper chambers of the heart (the atria).  In normal rhythm, the upper and lower chambers of the heart are electrically driven to contract in a coordinated sequence.  In atrial fibrillation, the atria lose their ability to contract because of rapid and chaotic electrical activity.  The lower chambers of the heart (the ventricles) continue to pump blood throughout the body, though with irregular and often faster rate due to the chaotic activity within the atria.        How do I know if I have atrial fibrillation?   Some people may feel their heart beating faster, harder, or irregularly while in atrial fibrillation.   Others may be lightheaded, fatigued, feel weak or tired or become more short of breath especially with activities.  Some patients have no symptoms at all.  Atrial fibrillation may be found due to an irregular pulse or on an electrocardiogram (ECG). Atrial fibrillation can start and stop on its own, and episodes can last from seconds to several months.      How common is atrial fibrillation?   An estimated 3-6 million people in the [place] have atrial fibrillation.  Atrial fibrillation is a common heart rhythm problem for older persons, affecting as estimated 12-15% of people over the age of 65 years of age.    What causes atrial fibrillation?   Age is the most important risk factor for atrial fibrillation.  Atrial fibrillation is more common in people with other heart disease, high blood pressure, diabetes, obesity, sleep apnea and in people who regularly consume alcohol.  Surgery, lung disease, or thyroid problems can lead to atrial fibrillation.  Atrial fibrillation has multiple possible causes, and in most cases a single cause cannot be found.  Atrial fibrillation is a progressive condition, usually starting with at an early stage with short and infrequent episodes.  In later stages of disease, more frequent and longer lasting episodes of atrial fibrillation occur, ultimately culminating in episodes which do not spontaneously terminate.  Generally, more enlargement and scarring within the upper chambers of the heart is observed as atrial fibrillation progresses from early to late-stage disease.    How is atrial fibrillation diagnosed and evaluated?    Because of its start-stop nature, atrial fibrillation can be challenging to diagnose.  Atrial fibrillation is most commonly diagnosed via cardiac rhythm recordings - either an ECG or wearable cardiac rhythm monitor.  For patients with pacemakers, defibrillators or implantable loop recorders, atrial fibrillation may be recorded via these devices.  Recently,  commercially available devices (eg. Apple Watch, Otelica device, certain FitBit devices, others) can allow patients to take 30 second cardiac rhythm recordings which may document atrial fibrillation.  Once atrial fibrillation is diagnosed, additional tests include blood tests and an echocardiogram.  The echocardiogram uses ultrasound to look at your heart to assess your cardiac function and evaluate for other heart disease.  Additional evaluation may include CT or MRI studies.    Is atrial fibrillation dangerous?   Atrial fibrillation is not usually a life-threatening arrhythmia.  The most serious consequences of atrial fibrillation including stroke and worsening of overall cardiac function.  While in atrial fibrillation, the upper cardiac chambers do not contract normally, resulting in slower blood flow and increased risk of clot formation.  If this blood clot becomes detached from the heart a stroke can occur.  Unfortunately, stroke can be the first sign of atrial fibrillation for some people.  With a stroke, you may notice abnormal sensation, weakness on one side of the body or face, changes in your vision or speech.  If you have any of these signs, you should contact EMS and be evaluated in an emergency room as soon as possible.      How is atrial fibrillation treated?     Several treatment options exist for suppressing atrial fibrillation - however, it is not an easily curable arrhythmia.  The first goal in managing atrial fibrillation is to minimize stroke risk.  The second goal is to improve symptoms associated with atrial fibrillation.  Finally, in patients with reduced cardiac function, maintaining normal rhythm can help improve cardiac function.      Blood thinners are used to reduce the risk of stroke in patients with high estimated stroke risk related to atrial fibrillation.  For patients at higher risk of bleeding related to blood thinner use, implantable devices may be an option to reduce stroke risk  without the need for long term blood thinner use.      Atrial fibrillation can be managed via two strategies: rate control and rhythm control.  In a rate control strategy, continued atrial fibrillation is accepted and medications (eg. beta-blockers or calcium channel blockers) are used to control the lower chamber rate.  In a rhythm control strategy, anti-arrhythmic medications or catheter ablation are used to maintain normal cardiac rhythm and slow disease progression by suppressing atrial fibrillation.  A procedure called a cardioversion, in which an electric shock is delivered through patches placed on the chest wall while under deep sedation, can be performed to temporarily restore normal cardiac rhythm, though does not address the chance of atrial fibrillation recurrence.  Treatments are more effective for earlier rather than later stage atrial fibrillation.  Lifestyle modifications (maintaining a healthy weight, aerobic exercise, diagnosing and treating sleep apnea, and minimizing alcohol intake) are important elements of atrial fibrillation rhythm control.     What is catheter ablation for atrial fibrillation?  Cardiac catheter ablation is a commonly performed, minimally invasive procedure performed by a cardiac electrophysiologist to treat many different cardiac rhythm abnormalities.  During catheter ablation, long, thin, flexible tubes are advanced into the heart via small sheaths inserted into the femoral veins and thermal energy (either heating or cooling) is applied within the heart to disrupt abnormal electrical activity.  Atrial fibrillation ablation is performed under general anesthesia, with procedures generally taking approximately 2-3 hours.  Patients are typically observed for 3-5 hours after the ablation, and in most cases can be discharged home the same day.  Atrial fibrillation ablation is associated with better outcomes (mortality, cardiovascular hospitalizations, atrial arrhythmia  recurrences) compared to antiarrhythmic drug therapy.  However, atrial fibrillation recurrences are not uncommon, and repeat catheter ablation may be offered.  Your electrophysiology team can review atrial fibrillation ablation, anticipated success rates, risks, and recovery expectations with you.    What are anti-arrhythmic medications?  Anti-arrhythmic medications are specialized drugs which alter cardiac electrical functioning to suppress arrhythmias.  There are several anti-arrhythmic medications available, each with its own success rate and side effects.  Some anti-arrhythmic medications are less effective though safer to use, others are more effective though have serious potential toxicities.  Atrial fibrillation recurrences are common and may require dose adjustment or change in antiarrhythmic therapy.  Your electrophysiology team will carefully consider which medication would be the best and safest for your particular case.      Can I live a normal life?    The goal of atrial fibrillation management is for patients to live normal lives without being limited by symptoms related to atrial fibrillation.    Are any additional educational resources available?  There are a number of excellent atrial fibrillation education resources available to you online.  A few options you may wish to review include:  hrsonline.org/guide-atrial-fibrillation  afibmatters.org  getsmartaboutafib.com  stopaf.com    What comes next?    Consider your management options and let us know how we can help in your decision process.  Please take medications as they have been prescribed.  You should also get any tests that may have been ordered for you.      When to Call Your Doctor or seek emergency care:  Call your doctor or seek emergency care if you have any significant changes with the following:  Weakness  Dizziness  Fainting  Fatigue  Shortness of breath  Chest pain with increased activity  If you are concerned that your heart rate is  too fast or too slow  Bleeding that does not stop in 10 minutes  Coughing or throwing up blood  Bloody diarrhea or bleeding hemorrhoids  Dark-colored urine or black stool  Allergic reactions:  Rash  Itching  Swelling  Trouble breathing or swallowing      Please call the [facility] at [phone] if you have concerns about your symptoms, your medicines, or your follow-up appointments.

## 2025-01-08 NOTE — LETTER
2025    Roverto Herbert MD, MD  [address]  [address] [place] [address]    RE: Nato RAMRIEZ Jose       Dear Colleague,     I had the pleasure of seeing Nato Garcia in the [facility], [facility].     [facility]  Cardiac Electrophysiology  [address]  [place] [address]   Office: [phone]  Fax: [phone]     Patient: Nato Garcia   : 1960       CHIEF COMPLAINT/REASON FOR VISIT  Paroxysmal atrial fibrillation      Assessment/Recommendations     [id number]/Paroxysmal atrial fibrillation - symptomatic, associated with increased risk of stroke, heart failure and mortality  [id number] 2  We reviewed atrial fibrillation, managing stroke risk via anticoagulation or LAAO, managing heart failure risk, rate control, cardioversion, antiarrhythmic drug therapy, and catheter ablation.  We discussed atrial fibrillation ablation procedures, anticipated success rates, the potential need for re-do ablation vs addition of anti-arrhythmic drugs, procedural risks (including groin bleeding, vascular injury, tamponade, phrenic or esophageal injury, stroke, pulmonary vein stenosis) and recovery expectations.  He would prefer consider options and will contact us with questions or decision as to how he would like to proceed.  - metoprolol 50mg every 6hrs as needed for atrial fibrillation  - continue metoprolol XL 100mg daily  - continue apixaban 5mg twice daily.  He would prefer to avoid long term anticoagulation.  Though he would not qualify at present, we discussed pLAAO - he will consider further.  We can consider further [id number] assessment via coronary CTA.  - we discussed the ongoing importance of lifestyle modification (maintaining a healthy weight, aerobic activity, sleep apnea diagnosis and management, alcohol avoidance) as part of a long term strategy for atrial fibrillation management    Follow up: as above           History of Present Illness  "Nato Garcia is a 64 year old male with paroxysmal atrial fibrillation, HTN referred by Dr. Rebolledo for consultation regarding atrial fibrillation.    Mr. Garcia's atrial fibrillation history is as summarized below:  Symptoms: palpitations  Symptom onset date: 2021  Diagnosis date: 2021 (Apple Watch)  Admissions/ER visits: 11/18/2024 - self-limited AF ~48hrs  Prior medical therapies: no I, III AADs  Prior DCCVs: none  Prior ablations: none  Percutaneous left atrial appendage occlusion: none    He notes no known AF recurrences beyond the 2021 and 11/18/2024 episodes.  He exercises regularly.  He denies chest pain, syncope.       Physical Examination  Review of Systems   VITALS: /72 (BP Location: Left arm, Patient Position: Sitting, Cuff Size: Adult Large)   Pulse 68   Resp 12   Ht 1.753 m (5' 9\")   Wt 83.5 kg (184 lb 1.6 oz)   BMI 27.19 kg/m    Wt Readings from Last 3 Encounters:   11/19/24 84.6 kg (186 lb 6.4 oz)   11/18/24 81.6 kg (180 lb)   10/27/22 84.1 kg (185 lb 4.8 oz)     CONSTITUTIONAL: well nourished, comfortable, no distress  EYES:  Conjunctivae pink, sclerae clear.    E/N/T:  Oral mucosa pink  RESPIRATORY:  Respiratory effort is normal  CARDIOVASCULAR:  normal S1 and S2  GASTROINTESTINAL:  Abdomen without masses or tenderness  EXTREMITIES:  No clubbing or cyanosis.    MUSCULOSKELETAL:  Overall grossly normal muscle strength  SKIN:  Overall, skin warm and dry, no lesions.  NEURO/PSYCH:  Oriented x 3 with normal affect.   Constitutional:  No weight loss or loss of appetite    Eyes:  No difficulty with vision, no double vision, no dry eyes  ENT:  No sore throat, difficulty swallowing; changes in hearing or tinnitus  Cardiovascular: As detailed above  Respiratory:  No cough  Musculoskeletal  No joint pain, muscle aches  Neurologic:  No syncope, lightheadedness, fainting spells   Hematologic: No easy bruising, excessive bleeding tendency   Gastrointestinal:  No jaundice, abdominal pain " or abdominal bloating  Genitourinary: No changes in urinary habits, no trouble urinating    Psychiatric: No anxiety or depression      Medical History  Surgical History   No past medical history on file. No past surgical history on file.      Family History Social History   No family history on file.     Social History     Tobacco Use     Smoking status: Never     Passive exposure: Never     Smokeless tobacco: Never   Vaping Use     Vaping status: Never Used   Substance Use Topics     Drug use: Never         Medications  Allergies     Current Outpatient Medications:      albuterol (PROAIR HFA/PROVENTIL HFA/VENTOLIN HFA) 108 (90 Base) MCG/ACT inhaler, INHALE 2 PUFFS 4 TIMES A DAY AS NEEDED, Disp: , Rfl:      amLODIPine (NORVASC) 10 MG tablet, Take 10 mg by mouth daily., Disp: , Rfl:      apixaban ANTICOAGULANT (ELIQUIS) 5 MG tablet, Take 1 tablet (5 mg) by mouth 2 times daily., Disp: 60 tablet, Rfl: 1     BREO ELLIPTA 200-25 MCG/ACT inhaler, Inhale 1 puff into the lungs daily., Disp: , Rfl:      celecoxib (CELEBREX) 200 MG capsule, TAKE 1 CAPSULE BY MOUTH TWICE A DAY AS NEEDED, Disp: , Rfl:      dicyclomine (BENTYL) 20 MG tablet, Take 2 tablets by mouth 2 times daily, Disp: , Rfl:      doxazosin (CARDURA) 8 MG tablet, Take 1 tablet by mouth at bedtime., Disp: , Rfl:      hydrochlorothiazide 12.5 MG tablet, Take 1 tablet (12.5 mg) by mouth daily., Disp: 30 tablet, Rfl: 0     lisinopril (ZESTRIL) 40 MG tablet, Take 40 mg by mouth daily, Disp: , Rfl:      metoprolol succinate ER (TOPROL XL) 100 MG 24 hr tablet, Take 1 tablet (100 mg) by mouth daily., Disp: 30 tablet, Rfl: 12     nystatin (MYCOSTATIN) 355516 UNIT/GM external cream, APPLY TO AFFECTED AREA 3 TIMES A DAY 15, Disp: , Rfl:      traZODone (DESYREL) 100 MG tablet, Take 100 mg by mouth At Bedtime, Disp: , Rfl:    No Known Allergies       Lab Results    Chemistry CBC Cardiac Enzymes/BNP/TSH/INR   Recent Labs   Lab Test 11/18/24  1344   *   POTASSIUM 3.6  "  CHLORIDE 97*   CO2 22   *   BUN 19.7   CR 0.83   GFRESTIMATED >90   ANDIE 10.2     Recent Labs   Lab Test 11/18/24  1344 10/08/24  1027 09/17/24  1429   CR 0.83 1.18* 1.00          Recent Labs   Lab Test 11/18/24  1344   WBC 9.3   HGB 15.3   HCT 41.5   MCV 88        Recent Labs   Lab Test 11/18/24  1344   HGB 15.3    No results for input(s): \"TROPONINI\" in the last 63613 hours.  No results for input(s): \"BNP\", \"NTBNPI\", \"NTBNP\" in the last 28743 hours.  Recent Labs   Lab Test 11/18/24  1344   TSH 4.53*     No results for input(s): \"INR\" in the last 01842 hours.      Data Review    ECGs (tracings independently reviewed)  11/19/2024 - SR 76bpm, QTC 461ms  11/18/2024 - AF, 148bpm    11/22/2024 TTE  1. Normal left ventricular size and systolic performance with a visually  estimated ejection fraction of 60-65%.  2. There is trace aortic insufficiency.  3. Normal right ventricular size and systolic performance.       55 minutes was spent reviewing the chart and in direct discussion with the patient.    Cc: Ismael Rebolledo MD, Roverto Herbert MD Amila Dilusha William, MD  1/8/2025  2:12 PM        Thank you for allowing me to participate in the care of your patient.      Sincerely,     Blessing Chua MD     [facility]  cc:   Ismael Rebolledo MD  [address]  [place] [address]      "

## 2025-01-16 ENCOUNTER — TELEPHONE (OUTPATIENT)
Dept: CARDIOLOGY | Facility: CLINIC | Age: 65
End: 2025-01-16
Payer: COMMERCIAL

## 2025-01-16 NOTE — TELEPHONE ENCOUNTER
[facility] Call Center    Phone Message    May a detailed message be left on voicemail: yes     Reason for Call: Medication Question or concern regarding medication   Prescription Clarification  Name of Medication:   apixaban ANTICOAGULANT (ELIQUIS) 5 MG tablet [[id number]] (Order [id number])   Prescribing Provider: Amaury     What on the order needs clarification? [facility] is calling for hold/bridge orders for the above medication. Pt is having a colonoscopy on 2/26/25 with a hold of 2 days starting on 02/24/25. Creatinine lab with date drawn if unable to do hold.       Action Taken: Other: cardiology     Travel Screening: Not Applicable      Thank you!  [facility]        Date of Service:

## 2025-01-20 NOTE — TELEPHONE ENCOUNTER
Recommendations discussed with [facility].  Understanding verbalized and they will notify pt.  No further questions asked.  -ral    ----- Message -----  From: Blessing Chua MD  Sent: 1/19/2025   9:05 AM CST  To: [facility] Left Atrial Appendage Closure Pool - e    Thanks Mignon pelayo to hold apixaban as planned, no bridging; can resume post-colonoscopy as directed by GI (typically 0-2 days post).    ThanksPeter

## 2025-02-06 ENCOUNTER — LAB REQUISITION (OUTPATIENT)
Dept: LAB | Facility: CLINIC | Age: 65
End: 2025-02-06
Payer: COMMERCIAL

## 2025-02-06 DIAGNOSIS — Z12.5 ENCOUNTER FOR SCREENING FOR MALIGNANT NEOPLASM OF PROSTATE: ICD-10-CM

## 2025-02-06 DIAGNOSIS — I10 ESSENTIAL (PRIMARY) HYPERTENSION: ICD-10-CM

## 2025-02-06 PROCEDURE — G0103 PSA SCREENING: HCPCS | Mod: ORL | Performed by: FAMILY MEDICINE

## 2025-02-06 PROCEDURE — 80061 LIPID PANEL: CPT | Mod: ORL | Performed by: FAMILY MEDICINE

## 2025-02-07 LAB
CHOLEST SERPL-MCNC: 225 MG/DL
FASTING STATUS PATIENT QL REPORTED: ABNORMAL
HDLC SERPL-MCNC: 69 MG/DL
LDLC SERPL CALC-MCNC: 129 MG/DL
NONHDLC SERPL-MCNC: 156 MG/DL
PSA SERPL DL<=0.01 NG/ML-MCNC: 0.9 NG/ML (ref 0–4.5)
TRIGL SERPL-MCNC: 136 MG/DL

## 2025-02-16 DIAGNOSIS — I48.0 PAROXYSMAL ATRIAL FIBRILLATION (H): ICD-10-CM

## 2025-03-30 DIAGNOSIS — I48.91 ATRIAL FIBRILLATION WITH RVR (H): ICD-10-CM

## 2025-03-31 RX ORDER — APIXABAN 5 MG/1
5 TABLET, FILM COATED ORAL 2 TIMES DAILY
Qty: 180 TABLET | Refills: 1 | Status: SHIPPED | OUTPATIENT
Start: 2025-03-31

## 2025-04-13 ENCOUNTER — HEALTH MAINTENANCE LETTER (OUTPATIENT)
Age: 65
End: 2025-04-13

## 2025-05-17 RX ORDER — HYDROCHLOROTHIAZIDE 12.5 MG/1
12.5 TABLET ORAL DAILY
Qty: 30 TABLET | Refills: 0 | OUTPATIENT
Start: 2025-05-17

## 2025-05-20 ENCOUNTER — LAB REQUISITION (OUTPATIENT)
Dept: LAB | Facility: CLINIC | Age: 65
End: 2025-05-20
Payer: COMMERCIAL

## 2025-05-20 DIAGNOSIS — Z01.818 ENCOUNTER FOR OTHER PREPROCEDURAL EXAMINATION: ICD-10-CM

## 2025-05-20 LAB
ANION GAP SERPL CALCULATED.3IONS-SCNC: 13 MMOL/L (ref 7–15)
BUN SERPL-MCNC: 11.1 MG/DL (ref 8–23)
CALCIUM SERPL-MCNC: 10.3 MG/DL (ref 8.8–10.4)
CHLORIDE SERPL-SCNC: 96 MMOL/L (ref 98–107)
CREAT SERPL-MCNC: 0.82 MG/DL (ref 0.67–1.17)
EGFRCR SERPLBLD CKD-EPI 2021: >90 ML/MIN/1.73M2
GLUCOSE SERPL-MCNC: 74 MG/DL (ref 70–99)
HCO3 SERPL-SCNC: 24 MMOL/L (ref 22–29)
POTASSIUM SERPL-SCNC: 3.5 MMOL/L (ref 3.4–5.3)
SODIUM SERPL-SCNC: 133 MMOL/L (ref 135–145)

## 2025-05-20 PROCEDURE — 80048 BASIC METABOLIC PNL TOTAL CA: CPT | Mod: ORL | Performed by: STUDENT IN AN ORGANIZED HEALTH CARE EDUCATION/TRAINING PROGRAM
